# Patient Record
Sex: MALE | Race: WHITE | NOT HISPANIC OR LATINO | URBAN - METROPOLITAN AREA
[De-identification: names, ages, dates, MRNs, and addresses within clinical notes are randomized per-mention and may not be internally consistent; named-entity substitution may affect disease eponyms.]

---

## 2017-11-25 ENCOUNTER — INPATIENT (INPATIENT)
Facility: HOSPITAL | Age: 82
LOS: 16 days | Discharge: NURSING, REHAB OR LONG-TERM CARE | End: 2017-12-12
Attending: SURGERY | Admitting: INTERNAL MEDICINE
Payer: MEDICARE

## 2017-11-25 VITALS
OXYGEN SATURATION: 95 % | DIASTOLIC BLOOD PRESSURE: 107 MMHG | RESPIRATION RATE: 23 BRPM | WEIGHT: 225.09 LBS | TEMPERATURE: 97 F | HEART RATE: 70 BPM | SYSTOLIC BLOOD PRESSURE: 174 MMHG | HEIGHT: 70 IN

## 2017-11-25 LAB
ALBUMIN SERPL ELPH-MCNC: 3.7 G/DL — SIGNIFICANT CHANGE UP (ref 3.3–5)
ALP SERPL-CCNC: 92 U/L — SIGNIFICANT CHANGE UP (ref 40–120)
ALT FLD-CCNC: 27 U/L — SIGNIFICANT CHANGE UP (ref 12–78)
ANION GAP SERPL CALC-SCNC: 17 MMOL/L — SIGNIFICANT CHANGE UP (ref 5–17)
APTT BLD: 47.2 SEC — HIGH (ref 27.5–37.4)
AST SERPL-CCNC: 27 U/L — SIGNIFICANT CHANGE UP (ref 15–37)
BILIRUB SERPL-MCNC: 0.6 MG/DL — SIGNIFICANT CHANGE UP (ref 0.2–1.2)
BUN SERPL-MCNC: 65 MG/DL — HIGH (ref 7–23)
CALCIUM SERPL-MCNC: 10.2 MG/DL — HIGH (ref 8.5–10.1)
CHLORIDE SERPL-SCNC: 97 MMOL/L — SIGNIFICANT CHANGE UP (ref 96–108)
CO2 SERPL-SCNC: 22 MMOL/L — SIGNIFICANT CHANGE UP (ref 22–31)
CREAT SERPL-MCNC: 2.6 MG/DL — HIGH (ref 0.5–1.3)
GLUCOSE SERPL-MCNC: 197 MG/DL — HIGH (ref 70–99)
HCT VFR BLD CALC: 46.9 % — SIGNIFICANT CHANGE UP (ref 39–50)
HGB BLD-MCNC: 15.3 G/DL — SIGNIFICANT CHANGE UP (ref 13–17)
INR BLD: 3.54 RATIO — HIGH (ref 0.88–1.16)
LACTATE SERPL-SCNC: 6.9 MMOL/L — CRITICAL HIGH (ref 0.7–2)
LACTATE SERPL-SCNC: 6.9 MMOL/L — CRITICAL HIGH (ref 0.7–2)
LIDOCAIN IGE QN: 178 U/L — SIGNIFICANT CHANGE UP (ref 73–393)
MCHC RBC-ENTMCNC: 31.6 PG — SIGNIFICANT CHANGE UP (ref 27–34)
MCHC RBC-ENTMCNC: 32.6 GM/DL — SIGNIFICANT CHANGE UP (ref 32–36)
MCV RBC AUTO: 96.9 FL — SIGNIFICANT CHANGE UP (ref 80–100)
PLATELET # BLD AUTO: 324 K/UL — SIGNIFICANT CHANGE UP (ref 150–400)
POTASSIUM SERPL-MCNC: 5.5 MMOL/L — HIGH (ref 3.5–5.3)
POTASSIUM SERPL-SCNC: 5.5 MMOL/L — HIGH (ref 3.5–5.3)
PROT SERPL-MCNC: 8.6 GM/DL — HIGH (ref 6–8.3)
PROTHROM AB SERPL-ACNC: 39.6 SEC — HIGH (ref 9.8–12.7)
RBC # BLD: 4.84 M/UL — SIGNIFICANT CHANGE UP (ref 4.2–5.8)
RBC # FLD: 13.5 % — SIGNIFICANT CHANGE UP (ref 11–15)
SODIUM SERPL-SCNC: 136 MMOL/L — SIGNIFICANT CHANGE UP (ref 135–145)
TROPONIN I SERPL-MCNC: 1.26 NG/ML — HIGH (ref 0.01–0.04)
WBC # BLD: 16.1 K/UL — HIGH (ref 3.8–10.5)
WBC # FLD AUTO: 16.1 K/UL — HIGH (ref 3.8–10.5)

## 2017-11-25 PROCEDURE — 71010: CPT | Mod: 26

## 2017-11-25 PROCEDURE — 99284 EMERGENCY DEPT VISIT MOD MDM: CPT

## 2017-11-25 RX ORDER — PIPERACILLIN AND TAZOBACTAM 4; .5 G/20ML; G/20ML
3.38 INJECTION, POWDER, LYOPHILIZED, FOR SOLUTION INTRAVENOUS ONCE
Qty: 0 | Refills: 0 | Status: COMPLETED | OUTPATIENT
Start: 2017-11-25 | End: 2017-11-25

## 2017-11-25 RX ORDER — GABAPENTIN 400 MG/1
3 CAPSULE ORAL
Qty: 0 | Refills: 0 | COMMUNITY

## 2017-11-25 RX ORDER — VANCOMYCIN HCL 1 G
1000 VIAL (EA) INTRAVENOUS ONCE
Qty: 0 | Refills: 0 | Status: COMPLETED | OUTPATIENT
Start: 2017-11-25 | End: 2017-11-26

## 2017-11-25 RX ORDER — IOHEXOL 300 MG/ML
30 INJECTION, SOLUTION INTRAVENOUS ONCE
Qty: 0 | Refills: 0 | Status: COMPLETED | OUTPATIENT
Start: 2017-11-25 | End: 2017-11-25

## 2017-11-25 RX ORDER — FAMOTIDINE 10 MG/ML
20 INJECTION INTRAVENOUS ONCE
Qty: 0 | Refills: 0 | Status: COMPLETED | OUTPATIENT
Start: 2017-11-25 | End: 2017-11-25

## 2017-11-25 RX ORDER — PANTOPRAZOLE SODIUM 20 MG/1
8 TABLET, DELAYED RELEASE ORAL
Qty: 80 | Refills: 0 | Status: DISCONTINUED | OUTPATIENT
Start: 2017-11-25 | End: 2017-11-26

## 2017-11-25 RX ORDER — SODIUM CHLORIDE 9 MG/ML
500 INJECTION INTRAMUSCULAR; INTRAVENOUS; SUBCUTANEOUS ONCE
Qty: 0 | Refills: 0 | Status: COMPLETED | OUTPATIENT
Start: 2017-11-25 | End: 2017-11-25

## 2017-11-25 RX ORDER — METOCLOPRAMIDE HCL 10 MG
10 TABLET ORAL ONCE
Qty: 0 | Refills: 0 | Status: COMPLETED | OUTPATIENT
Start: 2017-11-25 | End: 2017-11-25

## 2017-11-25 RX ORDER — SODIUM CHLORIDE 9 MG/ML
1000 INJECTION INTRAMUSCULAR; INTRAVENOUS; SUBCUTANEOUS ONCE
Qty: 0 | Refills: 0 | Status: COMPLETED | OUTPATIENT
Start: 2017-11-25 | End: 2017-11-25

## 2017-11-25 RX ORDER — TAMSULOSIN HYDROCHLORIDE 0.4 MG/1
1 CAPSULE ORAL
Qty: 0 | Refills: 0 | COMMUNITY

## 2017-11-25 RX ADMIN — PANTOPRAZOLE SODIUM 10 MG/HR: 20 TABLET, DELAYED RELEASE ORAL at 20:41

## 2017-11-25 RX ADMIN — FAMOTIDINE 20 MILLIGRAM(S): 10 INJECTION INTRAVENOUS at 20:30

## 2017-11-25 RX ADMIN — PIPERACILLIN AND TAZOBACTAM 200 GRAM(S): 4; .5 INJECTION, POWDER, LYOPHILIZED, FOR SOLUTION INTRAVENOUS at 23:23

## 2017-11-25 RX ADMIN — Medication 10 MILLIGRAM(S): at 20:30

## 2017-11-25 RX ADMIN — SODIUM CHLORIDE 1000 MILLILITER(S): 9 INJECTION INTRAMUSCULAR; INTRAVENOUS; SUBCUTANEOUS at 22:32

## 2017-11-25 RX ADMIN — SODIUM CHLORIDE 1000 MILLILITER(S): 9 INJECTION INTRAMUSCULAR; INTRAVENOUS; SUBCUTANEOUS at 23:14

## 2017-11-25 RX ADMIN — IOHEXOL 30 MILLILITER(S): 300 INJECTION, SOLUTION INTRAVENOUS at 20:41

## 2017-11-25 RX ADMIN — SODIUM CHLORIDE 500 MILLILITER(S): 9 INJECTION INTRAMUSCULAR; INTRAVENOUS; SUBCUTANEOUS at 20:30

## 2017-11-25 NOTE — ED PROVIDER NOTE - OBJECTIVE STATEMENT
89 yo M with 3 days of vomiting dark, tarry vomitus.  Pt. also has epigastric abd pain and abd distension that family noticed.  This never happened before.  They initially blamed thanksgiving [place], but realized something serious was wrong.  Pt. has no other complaints currently.    ROS: negative for fever, cough, headache, chest pain, shortness of breath, diarrhea, rash, paresthesia, and weakness.   PMH: a-fib, BPH, CHF, CVA, GERD, hip fx s/p hip replacement, HTN; Meds: coumadin, see EMR for full list; SH: Denies smoking/drinking/drug use   PCP: anyoku admitted pt. before  Daughter, Anneliese, contact: [phone] 89 yo M with 3 days of vomiting dark, tarry vomitus.  Pt. also has epigastric abd pain and abd distension that family noticed.  This never happened before.  They initially blamed thanksgiving [place], but realized something serious was wrong.  Pt. has no other complaints currently.    ROS: negative for fever, cough, headache, chest pain, shortness of breath, diarrhea, rash, paresthesia, and weakness.   PMH: a-fib s/p pacer, BPH, CHF, CVA, GERD, hip fx s/p hip replacement, HTN; Meds: coumadin, see EMR for full list; SH: Denies smoking/drinking/drug use   PCP: anyoku admitted pt. before  Daughter, Anneliese, contact: [phone]

## 2017-11-25 NOTE — ED PROVIDER NOTE - PROGRESS NOTE DETAILS
lactate not improved, trop elevated, but no cp and no acute ischemic change on EKG, elevated bun/cr likely dehydration, currently hydrating, awaiting ct abd/pel CT scan is consistent with ischemic bowel, Dr. Starks called, will come in  pt. currently stable, PA is evaluating patient now, will admit to Sx

## 2017-11-25 NOTE — ED PROVIDER NOTE - MEDICAL DECISION MAKING DETAILS
89 yo m with likely upper GIB  -iv ns bolus, pepcid, reglan, omnipaque prep for scan, pantoprazole, cardiac monitor  -basic labs, coag, type and screen, lipase, lactate (ischemic bowel), trop, cxr, ekg, ct ab/pel++ contrast  -f/u results, reeval, possible admit with GI consult, possible transfusion pending labs

## 2017-11-25 NOTE — CONSULT NOTE ADULT - SUBJECTIVE AND OBJECTIVE BOX
HPI:    HISTORY OF PRESENT ILLNESS:    High Risk Travel:  International Travel? No(1)    · Chief Complaint: The patient is a 89y Male complaining of abdominal pain.	  · HPI Objective Statement: 89 yo M with 3 days of vomiting dark, tarry vomitus.  Pt. also has epigastric abd pain and abd distension that family noticed.  This never happened before.  They initially blamed thanksgiving [place], but realized something serious was wrong.  Pt. has no other complaints currently.    ROS: negative for fever, cough, headache, chest pain, shortness of breath, diarrhea, rash, paresthesia, and weakness.   PMH: a-fib s/p pacer, BPH, CHF, CVA, GERD, hip fx s/p hip replacement, HTN; Meds: coumadin, see EMR for full list; SH: Denies smoking/drinking/drug use   PCP: juan admitted pt. before Daughter, Anneliese, contact: [phone]	    PAST MEDICAL/SURGICAL/FAMILY/SOCIAL HISTORY:    Past Medical History:  Afib  on Coumadin  BPH (benign prostatic hypertrophy)    CHF (congestive heart failure)    CVA (cerebral infarction)  no deficits  GERD (gastroesophageal reflux disease)    Hip fracture    HTN (hypertension)    Peripheral neuropathy    Prostate cancer.     Past Surgical History:  Artificial cardiac pacemaker    Status post-operative repair of hip fracture  ----------------------------------------------------------------------------------------------------------------------------------------------------------------  The patient was in his USOH until two days ago when he had developed N/V. Off and on through out the days. In the ER, it was coffee grounds. No prior history. Only abdomainl surgery was to remove a cyst from the kidney. Had a bowel movement yesterday. Patient deneis BM or flatus today. No Fever. On coumadin. NSAIDs (?)  Had a colonoscopy a while ago     PAST MEDICAL & SURGICAL HISTORY:  Hip fracture  Afib: on Coumadin  Peripheral neuropathy  CVA (cerebral infarction): no deficits  GERD (gastroesophageal reflux disease)  BPH (benign prostatic hypertrophy)  Prostate cancer  CHF (congestive heart failure)  HTN (hypertension)  Status post-operative repair of hip fracture  Artificial cardiac pacemaker      MEDICATIONS  (STANDING):  pantoprazole Infusion 8 mG/Hr (10 mL/Hr) IV Continuous <Continuous>  piperacillin/tazobactam IVPB. 3.375 Gram(s) IV Intermittent once  vancomycin  IVPB 1000 milliGRAM(s) IV Intermittent once    MEDICATIONS  (PRN):      Allergies    No Known Allergies    Intolerances        FAMILY HISTORY:  No pertinent family history in first degree relatives      REVIEW OF SYSTEMS:    CONSTITUTIONAL: No fever, weight loss, or fatigue  EYES: No eye pain, visual disturbances, or discharge  ENMT:  No  tinnitus, vertigo; No sinus or throat pain  NECK: No pain or stiffness  BREASTS: No pain, masses, or nipple discharge  RESPIRATORY: No cough, wheezing, chills or hemoptysis; Minimal shortness of breath  CARDIOVASCULAR: No chest pain, palpitations, dizziness, or leg swelling  GASTROINTESTINAL: See above  NEUROLOGICAL: No headaches, memory loss, loss of strength, numbness, or tremors  SKIN: No itching, burning, rashes, or lesions   LYMPH NODES: No enlarged glands  ENDOCRINE: No heat or cold intolerance; No hair loss  MUSCULOSKELETAL: No joint pain or swelling; No muscle, back, or extremity pain  PSYCHIATRIC: No depression, anxiety, mood swings, or difficulty sleeping  HEME/LYMPH: No easy bruising, or bleeding gums  ALLERGY AND IMMUNOLOGIC: No hives or eczema          SOCIAL HISTORY:    FAMILY HISTORY:  No pertinent family history in first degree relatives      Vital Signs Last 24 Hrs  T(C): 36.2 (25 Nov 2017 19:44), Max: 36.2 (25 Nov 2017 19:44)  T(F): 97.2 (25 Nov 2017 19:44), Max: 97.2 (25 Nov 2017 19:44)  HR: 70 (25 Nov 2017 19:44) (70 - 70)  BP: 174/107 (25 Nov 2017 19:44) (174/107 - 174/107)  BP(mean): --  RR: 23 (25 Nov 2017 19:44) (23 - 23)  SpO2: 95% (25 Nov 2017 19:44) (95% - 95%)    PHYSICAL EXAM:    GENERAL: NAD, well-groomed, well-developed  HEAD:  Atraumatic, Normocephalic  EYES: EOMI, PERRLA, conjunctiva and sclera clear  NECK: Supple, No JVD, Normal thyroid  NERVOUS SYSTEM:  Alert & Oriented  Good concentration; Motor Strength 5/5 B/L upper and lower extremities;   HEART: Regular rate and rhythm; No murmurs, rubs, or gallops  ABDOMEN: Soft, Nontender, distended (+) ; Bowel sounds present  EXTREMITIES:  2+ Peripheral Pulses, No clubbing, cyanosis, or edema  LYMPH: No lymphadenopathy noted   RECTAL: Moderate amount of soft brown stool. Not impacted.  SKIN: No rashes or lesions    LABS:                        15.3   16.1  )-----------( 324      ( 25 Nov 2017 20:31 )             46.9       CBC:  11-25 @ 20:31  WBC  16.1  HGB 15.3  HCT 46.9 Plate 324  MCV 96.9           25 Nov 2017 20:31    136    |  97     |  65     ----------------------------<  197    5.5     |  22     |  2.60     Ca    10.2       25 Nov 2017 20:31    TPro  8.6    /  Alb  3.7    /  TBili  0.6    /  DBili  x      /  AST  27     /  ALT  27     /  AlkPhos  92     25 Nov 2017 20:31    PT/INR - ( 25 Nov 2017 22:03 )   PT: 39.6 sec;   INR: 3.54 ratio         PTT - ( 25 Nov 2017 22:03 )  PTT:47.2 sec        RADIOLOGY & ADDITIONAL STUDIES:

## 2017-11-25 NOTE — ED PROVIDER NOTE - INTERPRETATION
EKG performed in ED, slightly irregular at 67, no evidence of pacing, No acute ischemic changes, LBBB

## 2017-11-25 NOTE — CONSULT NOTE ADULT - ASSESSMENT
· Chief Complaint: The patient is a 89y Male complaining of abdominal pain.	  · HPI Objective Statement: 89 yo M with 3 days of vomiting dark, tarry vomitus.  Pt. also has epigastric abd pain and abd distension that family noticed.  This never happened before.  They initially blamed thanksgiving [place], but realized something serious was wrong.  Pt. has no other complaints currently.    ROS: negative for fever, cough, headache, chest pain, shortness of breath, diarrhea, rash, paresthesia, and weakness.   PMH: a-fib s/p pacer, BPH, CHF, CVA, GERD, hip fx s/p hip replacement, HTN; Meds: coumadin, see EMR for full list; SH: Denies smoking/drinking/drug use   PCP: chelseyu admitted pt. before Daughter, Anneliese, contact: [phone]	  he patient was in his USOH until two days ago when he had developed N/V. Off and on through out the days. In the ER, it was coffee grounds. No prior history. Only abdomainl surgery was to remove a cyst from the kidney. Had a bowel movement yesterday. Patient deneis BM or flatus today. No Fever. On coumadin. NSAIDs (?)  Had a colonoscopy a while ago   ------------Coffee ground emesis - KUB, CT SCAN, (?) NGT, IV Fluid, f/u CBC, IV PPI, NPO    ( discussed with son and daughter - in - law )

## 2017-11-25 NOTE — ED PROVIDER NOTE - PHYSICAL EXAMINATION
Vitals: WNL  Gen: AAOx3, NAD, sitting comfortably in stretcher, answering questions appropriately, hard of hearing, non-toxic  Head: ncat, perrla, eomi b/l  Neck: supple, no lymphadenopathy, no midline deviation  Heart: rrr, no m/r/g  Lungs: CTA b/l, no rales/ronchi/wheezes  Abd: soft, tender in epigastrium, + distension  Ext: no clubbing/cyanosis/edema  Neuro: sensation and muscle strength intact b/l, moving all 4 ext without issue, no facial weakness

## 2017-11-25 NOTE — ED ADULT NURSE NOTE - OBJECTIVE STATEMENT
Assumed care of patient at this time with complaint of abdominal pain and vomiting that started yesterday, brown emesis

## 2017-11-25 NOTE — ED PROVIDER NOTE - CARE PLAN
Principal Discharge DX:	Upper GI bleed Principal Discharge DX:	Upper GI bleed  Secondary Diagnosis:	Ischemic bowel syndrome

## 2017-11-25 NOTE — ED ADULT NURSE NOTE - PMH
Afib  on Coumadin  BPH (benign prostatic hypertrophy)    CHF (congestive heart failure)    CVA (cerebral infarction)  no deficits  GERD (gastroesophageal reflux disease)    Hip fracture    HTN (hypertension)    Peripheral neuropathy    Prostate cancer

## 2017-11-26 DIAGNOSIS — K55.9 VASCULAR DISORDER OF INTESTINE, UNSPECIFIED: ICD-10-CM

## 2017-11-26 DIAGNOSIS — Z85.46 PERSONAL HISTORY OF MALIGNANT NEOPLASM OF PROSTATE: Chronic | ICD-10-CM

## 2017-11-26 DIAGNOSIS — K21.9 GASTRO-ESOPHAGEAL REFLUX DISEASE WITHOUT ESOPHAGITIS: ICD-10-CM

## 2017-11-26 DIAGNOSIS — R74.8 ABNORMAL LEVELS OF OTHER SERUM ENZYMES: ICD-10-CM

## 2017-11-26 DIAGNOSIS — I50.9 HEART FAILURE, UNSPECIFIED: ICD-10-CM

## 2017-11-26 DIAGNOSIS — I10 ESSENTIAL (PRIMARY) HYPERTENSION: ICD-10-CM

## 2017-11-26 DIAGNOSIS — I48.91 UNSPECIFIED ATRIAL FIBRILLATION: ICD-10-CM

## 2017-11-26 DIAGNOSIS — N17.9 ACUTE KIDNEY FAILURE, UNSPECIFIED: ICD-10-CM

## 2017-11-26 DIAGNOSIS — N40.0 BENIGN PROSTATIC HYPERPLASIA WITHOUT LOWER URINARY TRACT SYMPTOMS: ICD-10-CM

## 2017-11-26 DIAGNOSIS — G62.9 POLYNEUROPATHY, UNSPECIFIED: ICD-10-CM

## 2017-11-26 LAB
ABO RH CONFIRMATION: SIGNIFICANT CHANGE UP
ANION GAP SERPL CALC-SCNC: 11 MMOL/L — SIGNIFICANT CHANGE UP (ref 5–17)
ANION GAP SERPL CALC-SCNC: 12 MMOL/L — SIGNIFICANT CHANGE UP (ref 5–17)
ANION GAP SERPL CALC-SCNC: 14 MMOL/L — SIGNIFICANT CHANGE UP (ref 5–17)
APTT BLD: 32.7 SEC — SIGNIFICANT CHANGE UP (ref 27.5–37.4)
APTT BLD: 42.5 SEC — HIGH (ref 27.5–37.4)
BASE EXCESS BLDA CALC-SCNC: -6.4 MMOL/L — LOW (ref -2–2)
BLOOD GAS COMMENTS: SIGNIFICANT CHANGE UP
BLOOD GAS SOURCE: SIGNIFICANT CHANGE UP
BUN SERPL-MCNC: 66 MG/DL — HIGH (ref 7–23)
BUN SERPL-MCNC: 66 MG/DL — HIGH (ref 7–23)
BUN SERPL-MCNC: 70 MG/DL — HIGH (ref 7–23)
CALCIUM SERPL-MCNC: 8 MG/DL — LOW (ref 8.5–10.1)
CALCIUM SERPL-MCNC: 8 MG/DL — LOW (ref 8.5–10.1)
CALCIUM SERPL-MCNC: 8.7 MG/DL — SIGNIFICANT CHANGE UP (ref 8.5–10.1)
CHLORIDE SERPL-SCNC: 105 MMOL/L — SIGNIFICANT CHANGE UP (ref 96–108)
CHLORIDE SERPL-SCNC: 106 MMOL/L — SIGNIFICANT CHANGE UP (ref 96–108)
CHLORIDE SERPL-SCNC: 106 MMOL/L — SIGNIFICANT CHANGE UP (ref 96–108)
CK SERPL-CCNC: 295 U/L — SIGNIFICANT CHANGE UP (ref 26–308)
CO2 SERPL-SCNC: 19 MMOL/L — LOW (ref 22–31)
CO2 SERPL-SCNC: 21 MMOL/L — LOW (ref 22–31)
CO2 SERPL-SCNC: 23 MMOL/L — SIGNIFICANT CHANGE UP (ref 22–31)
CREAT ?TM UR-MCNC: 115 MG/DL — SIGNIFICANT CHANGE UP
CREAT SERPL-MCNC: 2.5 MG/DL — HIGH (ref 0.5–1.3)
CREAT SERPL-MCNC: 2.52 MG/DL — HIGH (ref 0.5–1.3)
CREAT SERPL-MCNC: 2.56 MG/DL — HIGH (ref 0.5–1.3)
GLUCOSE SERPL-MCNC: 125 MG/DL — HIGH (ref 70–99)
GLUCOSE SERPL-MCNC: 145 MG/DL — HIGH (ref 70–99)
GLUCOSE SERPL-MCNC: 169 MG/DL — HIGH (ref 70–99)
HCO3 BLDA-SCNC: 19 MMOL/L — LOW (ref 21–29)
HCT VFR BLD CALC: 37 % — LOW (ref 39–50)
HCT VFR BLD CALC: 42.7 % — SIGNIFICANT CHANGE UP (ref 39–50)
HGB BLD-MCNC: 11.9 G/DL — LOW (ref 13–17)
HGB BLD-MCNC: 13.6 G/DL — SIGNIFICANT CHANGE UP (ref 13–17)
HOROWITZ INDEX BLDA+IHG-RTO: 35 — SIGNIFICANT CHANGE UP
INR BLD: 1.71 RATIO — HIGH (ref 0.88–1.16)
INR BLD: 3.65 RATIO — HIGH (ref 0.88–1.16)
INR BLD: 5.39 RATIO — CRITICAL HIGH (ref 0.88–1.16)
LACTATE SERPL-SCNC: 5.4 MMOL/L — CRITICAL HIGH (ref 0.7–2)
LACTATE SERPL-SCNC: 6.2 MMOL/L — CRITICAL HIGH (ref 0.7–2)
LACTATE SERPL-SCNC: 7.2 MMOL/L — CRITICAL HIGH (ref 0.7–2)
LACTATE SERPL-SCNC: 8.3 MMOL/L — CRITICAL HIGH (ref 0.7–2)
MAGNESIUM SERPL-MCNC: 2 MG/DL — SIGNIFICANT CHANGE UP (ref 1.6–2.6)
MAGNESIUM SERPL-MCNC: 2.3 MG/DL — SIGNIFICANT CHANGE UP (ref 1.6–2.6)
MCHC RBC-ENTMCNC: 31.8 GM/DL — LOW (ref 32–36)
MCHC RBC-ENTMCNC: 31.8 PG — SIGNIFICANT CHANGE UP (ref 27–34)
MCHC RBC-ENTMCNC: 31.8 PG — SIGNIFICANT CHANGE UP (ref 27–34)
MCHC RBC-ENTMCNC: 32.1 GM/DL — SIGNIFICANT CHANGE UP (ref 32–36)
MCV RBC AUTO: 100 FL — SIGNIFICANT CHANGE UP (ref 80–100)
MCV RBC AUTO: 99.1 FL — SIGNIFICANT CHANGE UP (ref 80–100)
OSMOLALITY UR: 467 MOS/KG — SIGNIFICANT CHANGE UP (ref 50–1200)
PCO2 BLDA: 37 MMHG — SIGNIFICANT CHANGE UP (ref 32–46)
PH BLD: 7.32 — LOW (ref 7.35–7.45)
PHOSPHATE SERPL-MCNC: 3.4 MG/DL — SIGNIFICANT CHANGE UP (ref 2.5–4.5)
PHOSPHATE SERPL-MCNC: 3.5 MG/DL — SIGNIFICANT CHANGE UP (ref 2.5–4.5)
PLATELET # BLD AUTO: 205 K/UL — SIGNIFICANT CHANGE UP (ref 150–400)
PLATELET # BLD AUTO: 253 K/UL — SIGNIFICANT CHANGE UP (ref 150–400)
PO2 BLDA: 99 MMHG — SIGNIFICANT CHANGE UP (ref 74–108)
POTASSIUM SERPL-MCNC: 5.1 MMOL/L — SIGNIFICANT CHANGE UP (ref 3.5–5.3)
POTASSIUM SERPL-MCNC: 5.8 MMOL/L — HIGH (ref 3.5–5.3)
POTASSIUM SERPL-MCNC: 5.8 MMOL/L — HIGH (ref 3.5–5.3)
POTASSIUM SERPL-SCNC: 5.1 MMOL/L — SIGNIFICANT CHANGE UP (ref 3.5–5.3)
POTASSIUM SERPL-SCNC: 5.8 MMOL/L — HIGH (ref 3.5–5.3)
POTASSIUM SERPL-SCNC: 5.8 MMOL/L — HIGH (ref 3.5–5.3)
POTASSIUM UR-SCNC: 86 MMOL/L — SIGNIFICANT CHANGE UP
PROTHROM AB SERPL-ACNC: 18.9 SEC — HIGH (ref 9.8–12.7)
PROTHROM AB SERPL-ACNC: 40.8 SEC — HIGH (ref 9.8–12.7)
PROTHROM AB SERPL-ACNC: 60.8 SEC — HIGH (ref 9.8–12.7)
RBC # BLD: 3.73 M/UL — LOW (ref 4.2–5.8)
RBC # BLD: 4.27 M/UL — SIGNIFICANT CHANGE UP (ref 4.2–5.8)
RBC # FLD: 13.6 % — SIGNIFICANT CHANGE UP (ref 11–15)
RBC # FLD: 13.9 % — SIGNIFICANT CHANGE UP (ref 11–15)
SAO2 % BLDA: 97 % — HIGH (ref 92–96)
SODIUM SERPL-SCNC: 138 MMOL/L — SIGNIFICANT CHANGE UP (ref 135–145)
SODIUM SERPL-SCNC: 139 MMOL/L — SIGNIFICANT CHANGE UP (ref 135–145)
SODIUM SERPL-SCNC: 140 MMOL/L — SIGNIFICANT CHANGE UP (ref 135–145)
SODIUM UR-SCNC: <20 MMOL/L — SIGNIFICANT CHANGE UP
TROPONIN I SERPL-MCNC: 1.3 NG/ML — HIGH (ref 0.01–0.04)
TROPONIN I SERPL-MCNC: 1.4 NG/ML — HIGH (ref 0.01–0.04)
WBC # BLD: 10.1 K/UL — SIGNIFICANT CHANGE UP (ref 3.8–10.5)
WBC # BLD: 13.8 K/UL — HIGH (ref 3.8–10.5)
WBC # FLD AUTO: 10.1 K/UL — SIGNIFICANT CHANGE UP (ref 3.8–10.5)
WBC # FLD AUTO: 13.8 K/UL — HIGH (ref 3.8–10.5)

## 2017-11-26 PROCEDURE — 49505 PRP I/HERN INIT REDUC >5 YR: CPT | Mod: AS

## 2017-11-26 PROCEDURE — 74176 CT ABD & PELVIS W/O CONTRAST: CPT | Mod: 26

## 2017-11-26 PROCEDURE — 93010 ELECTROCARDIOGRAM REPORT: CPT

## 2017-11-26 PROCEDURE — 49320 DIAG LAPARO SEPARATE PROC: CPT | Mod: AS

## 2017-11-26 PROCEDURE — 71010: CPT | Mod: 26

## 2017-11-26 PROCEDURE — 99291 CRITICAL CARE FIRST HOUR: CPT

## 2017-11-26 RX ORDER — PANTOPRAZOLE SODIUM 20 MG/1
40 TABLET, DELAYED RELEASE ORAL DAILY
Qty: 0 | Refills: 0 | Status: DISCONTINUED | OUTPATIENT
Start: 2017-11-26 | End: 2017-11-26

## 2017-11-26 RX ORDER — DEXTROSE 50 % IN WATER 50 %
50 SYRINGE (ML) INTRAVENOUS ONCE
Qty: 0 | Refills: 0 | Status: COMPLETED | OUTPATIENT
Start: 2017-11-26 | End: 2017-11-26

## 2017-11-26 RX ORDER — FENTANYL CITRATE 50 UG/ML
50 INJECTION INTRAVENOUS ONCE
Qty: 0 | Refills: 0 | Status: DISCONTINUED | OUTPATIENT
Start: 2017-11-26 | End: 2017-11-26

## 2017-11-26 RX ORDER — GABAPENTIN 400 MG/1
300 CAPSULE ORAL THREE TIMES A DAY
Qty: 0 | Refills: 0 | Status: DISCONTINUED | OUTPATIENT
Start: 2017-11-26 | End: 2017-11-26

## 2017-11-26 RX ORDER — SODIUM CHLORIDE 9 MG/ML
1000 INJECTION, SOLUTION INTRAVENOUS
Qty: 0 | Refills: 0 | Status: DISCONTINUED | OUTPATIENT
Start: 2017-11-26 | End: 2017-11-26

## 2017-11-26 RX ORDER — FENTANYL CITRATE 50 UG/ML
50 INJECTION INTRAVENOUS EVERY 4 HOURS
Qty: 0 | Refills: 0 | Status: DISCONTINUED | OUTPATIENT
Start: 2017-11-26 | End: 2017-11-28

## 2017-11-26 RX ORDER — PANTOPRAZOLE SODIUM 20 MG/1
40 TABLET, DELAYED RELEASE ORAL DAILY
Qty: 0 | Refills: 0 | Status: DISCONTINUED | OUTPATIENT
Start: 2017-11-26 | End: 2017-11-27

## 2017-11-26 RX ORDER — TAMSULOSIN HYDROCHLORIDE 0.4 MG/1
0.4 CAPSULE ORAL AT BEDTIME
Qty: 0 | Refills: 0 | Status: DISCONTINUED | OUTPATIENT
Start: 2017-11-26 | End: 2017-11-26

## 2017-11-26 RX ORDER — FUROSEMIDE 40 MG
20 TABLET ORAL DAILY
Qty: 0 | Refills: 0 | Status: DISCONTINUED | OUTPATIENT
Start: 2017-11-26 | End: 2017-11-26

## 2017-11-26 RX ORDER — PIPERACILLIN AND TAZOBACTAM 4; .5 G/20ML; G/20ML
3.38 INJECTION, POWDER, LYOPHILIZED, FOR SOLUTION INTRAVENOUS EVERY 8 HOURS
Qty: 0 | Refills: 0 | Status: DISCONTINUED | OUTPATIENT
Start: 2017-11-26 | End: 2017-11-26

## 2017-11-26 RX ORDER — LATANOPROST 0.05 MG/ML
1 SOLUTION/ DROPS OPHTHALMIC; TOPICAL AT BEDTIME
Qty: 0 | Refills: 0 | Status: DISCONTINUED | OUTPATIENT
Start: 2017-11-26 | End: 2017-11-26

## 2017-11-26 RX ORDER — SODIUM CHLORIDE 9 MG/ML
1000 INJECTION INTRAMUSCULAR; INTRAVENOUS; SUBCUTANEOUS
Qty: 0 | Refills: 0 | Status: DISCONTINUED | OUTPATIENT
Start: 2017-11-26 | End: 2017-11-29

## 2017-11-26 RX ORDER — MORPHINE SULFATE 50 MG/1
2 CAPSULE, EXTENDED RELEASE ORAL EVERY 6 HOURS
Qty: 0 | Refills: 0 | Status: DISCONTINUED | OUTPATIENT
Start: 2017-11-26 | End: 2017-11-26

## 2017-11-26 RX ORDER — BRIMONIDINE TARTRATE 2 MG/MG
1 SOLUTION/ DROPS OPHTHALMIC
Qty: 0 | Refills: 0 | Status: DISCONTINUED | OUTPATIENT
Start: 2017-11-26 | End: 2017-12-12

## 2017-11-26 RX ORDER — BRIMONIDINE TARTRATE 2 MG/MG
1 SOLUTION/ DROPS OPHTHALMIC
Qty: 0 | Refills: 0 | Status: DISCONTINUED | OUTPATIENT
Start: 2017-11-26 | End: 2017-11-26

## 2017-11-26 RX ORDER — QUETIAPINE FUMARATE 200 MG/1
25 TABLET, FILM COATED ORAL THREE TIMES A DAY
Qty: 0 | Refills: 0 | Status: DISCONTINUED | OUTPATIENT
Start: 2017-11-26 | End: 2017-11-26

## 2017-11-26 RX ORDER — CHLORHEXIDINE GLUCONATE 213 G/1000ML
1 SOLUTION TOPICAL DAILY
Qty: 0 | Refills: 0 | Status: DISCONTINUED | OUTPATIENT
Start: 2017-11-26 | End: 2017-11-29

## 2017-11-26 RX ORDER — SODIUM CHLORIDE 9 MG/ML
1000 INJECTION INTRAMUSCULAR; INTRAVENOUS; SUBCUTANEOUS ONCE
Qty: 0 | Refills: 0 | Status: COMPLETED | OUTPATIENT
Start: 2017-11-26 | End: 2017-11-26

## 2017-11-26 RX ORDER — INSULIN HUMAN 100 [IU]/ML
10 INJECTION, SOLUTION SUBCUTANEOUS ONCE
Qty: 0 | Refills: 0 | Status: COMPLETED | OUTPATIENT
Start: 2017-11-26 | End: 2017-11-26

## 2017-11-26 RX ORDER — TIMOLOL 0.5 %
1 DROPS OPHTHALMIC (EYE)
Qty: 0 | Refills: 0 | Status: DISCONTINUED | OUTPATIENT
Start: 2017-11-26 | End: 2017-11-26

## 2017-11-26 RX ORDER — MORPHINE SULFATE 50 MG/1
4 CAPSULE, EXTENDED RELEASE ORAL EVERY 6 HOURS
Qty: 0 | Refills: 0 | Status: DISCONTINUED | OUTPATIENT
Start: 2017-11-26 | End: 2017-11-26

## 2017-11-26 RX ORDER — CALCIUM GLUCONATE 100 MG/ML
1 VIAL (ML) INTRAVENOUS ONCE
Qty: 0 | Refills: 0 | Status: COMPLETED | OUTPATIENT
Start: 2017-11-26 | End: 2017-11-26

## 2017-11-26 RX ORDER — CHLORHEXIDINE GLUCONATE 213 G/1000ML
15 SOLUTION TOPICAL
Qty: 0 | Refills: 0 | Status: DISCONTINUED | OUTPATIENT
Start: 2017-11-26 | End: 2017-11-27

## 2017-11-26 RX ORDER — SODIUM CHLORIDE 9 MG/ML
1000 INJECTION, SOLUTION INTRAVENOUS
Qty: 0 | Refills: 0 | Status: COMPLETED | OUTPATIENT
Start: 2017-11-26 | End: 2017-11-26

## 2017-11-26 RX ORDER — SERTRALINE 25 MG/1
25 TABLET, FILM COATED ORAL DAILY
Qty: 0 | Refills: 0 | Status: DISCONTINUED | OUTPATIENT
Start: 2017-11-26 | End: 2017-11-26

## 2017-11-26 RX ORDER — SODIUM CHLORIDE 9 MG/ML
1000 INJECTION INTRAMUSCULAR; INTRAVENOUS; SUBCUTANEOUS
Qty: 0 | Refills: 0 | Status: DISCONTINUED | OUTPATIENT
Start: 2017-11-26 | End: 2017-11-26

## 2017-11-26 RX ORDER — ONDANSETRON 8 MG/1
4 TABLET, FILM COATED ORAL EVERY 6 HOURS
Qty: 0 | Refills: 0 | Status: DISCONTINUED | OUTPATIENT
Start: 2017-11-26 | End: 2017-11-26

## 2017-11-26 RX ORDER — SODIUM BICARBONATE 1 MEQ/ML
50 SYRINGE (ML) INTRAVENOUS ONCE
Qty: 0 | Refills: 0 | Status: COMPLETED | OUTPATIENT
Start: 2017-11-26 | End: 2017-11-26

## 2017-11-26 RX ORDER — PIPERACILLIN AND TAZOBACTAM 4; .5 G/20ML; G/20ML
3.38 INJECTION, POWDER, LYOPHILIZED, FOR SOLUTION INTRAVENOUS EVERY 8 HOURS
Qty: 0 | Refills: 0 | Status: COMPLETED | OUTPATIENT
Start: 2017-11-26 | End: 2017-11-27

## 2017-11-26 RX ORDER — PROPOFOL 10 MG/ML
10 INJECTION, EMULSION INTRAVENOUS
Qty: 1000 | Refills: 0 | Status: DISCONTINUED | OUTPATIENT
Start: 2017-11-26 | End: 2017-11-27

## 2017-11-26 RX ORDER — MORPHINE SULFATE 50 MG/1
4 CAPSULE, EXTENDED RELEASE ORAL ONCE
Qty: 0 | Refills: 0 | Status: DISCONTINUED | OUTPATIENT
Start: 2017-11-26 | End: 2017-11-26

## 2017-11-26 RX ORDER — CARVEDILOL PHOSPHATE 80 MG/1
3.12 CAPSULE, EXTENDED RELEASE ORAL EVERY 12 HOURS
Qty: 0 | Refills: 0 | Status: DISCONTINUED | OUTPATIENT
Start: 2017-11-26 | End: 2017-11-26

## 2017-11-26 RX ORDER — SPIRONOLACTONE 25 MG/1
25 TABLET, FILM COATED ORAL DAILY
Qty: 0 | Refills: 0 | Status: DISCONTINUED | OUTPATIENT
Start: 2017-11-26 | End: 2017-11-26

## 2017-11-26 RX ORDER — PHYTONADIONE (VIT K1) 5 MG
10 TABLET ORAL ONCE
Qty: 0 | Refills: 0 | Status: COMPLETED | OUTPATIENT
Start: 2017-11-26 | End: 2017-11-26

## 2017-11-26 RX ADMIN — SODIUM CHLORIDE 1000 MILLILITER(S): 9 INJECTION INTRAMUSCULAR; INTRAVENOUS; SUBCUTANEOUS at 18:00

## 2017-11-26 RX ADMIN — PROPOFOL 6.13 MICROGRAM(S)/KG/MIN: 10 INJECTION, EMULSION INTRAVENOUS at 13:01

## 2017-11-26 RX ADMIN — Medication 250 MILLIGRAM(S): at 00:07

## 2017-11-26 RX ADMIN — INSULIN HUMAN 10 UNIT(S): 100 INJECTION, SOLUTION SUBCUTANEOUS at 20:00

## 2017-11-26 RX ADMIN — SODIUM CHLORIDE 100 MILLILITER(S): 9 INJECTION INTRAMUSCULAR; INTRAVENOUS; SUBCUTANEOUS at 08:08

## 2017-11-26 RX ADMIN — FENTANYL CITRATE 50 MICROGRAM(S): 50 INJECTION INTRAVENOUS at 09:15

## 2017-11-26 RX ADMIN — BRIMONIDINE TARTRATE 1 DROP(S): 2 SOLUTION/ DROPS OPHTHALMIC at 18:34

## 2017-11-26 RX ADMIN — FENTANYL CITRATE 50 MICROGRAM(S): 50 INJECTION INTRAVENOUS at 09:35

## 2017-11-26 RX ADMIN — Medication 102 MILLIGRAM(S): at 16:18

## 2017-11-26 RX ADMIN — SODIUM CHLORIDE 100 MILLILITER(S): 9 INJECTION INTRAMUSCULAR; INTRAVENOUS; SUBCUTANEOUS at 20:01

## 2017-11-26 RX ADMIN — PIPERACILLIN AND TAZOBACTAM 25 GRAM(S): 4; .5 INJECTION, POWDER, LYOPHILIZED, FOR SOLUTION INTRAVENOUS at 21:04

## 2017-11-26 RX ADMIN — CHLORHEXIDINE GLUCONATE 1 APPLICATION(S): 213 SOLUTION TOPICAL at 13:00

## 2017-11-26 RX ADMIN — PROPOFOL 6.13 MICROGRAM(S)/KG/MIN: 10 INJECTION, EMULSION INTRAVENOUS at 18:17

## 2017-11-26 RX ADMIN — FENTANYL CITRATE 50 MICROGRAM(S): 50 INJECTION INTRAVENOUS at 20:26

## 2017-11-26 RX ADMIN — MORPHINE SULFATE 4 MILLIGRAM(S): 50 CAPSULE, EXTENDED RELEASE ORAL at 01:40

## 2017-11-26 RX ADMIN — SODIUM CHLORIDE 1000 MILLILITER(S): 9 INJECTION, SOLUTION INTRAVENOUS at 14:00

## 2017-11-26 RX ADMIN — PANTOPRAZOLE SODIUM 40 MILLIGRAM(S): 20 TABLET, DELAYED RELEASE ORAL at 13:01

## 2017-11-26 RX ADMIN — FENTANYL CITRATE 50 MICROGRAM(S): 50 INJECTION INTRAVENOUS at 20:45

## 2017-11-26 RX ADMIN — Medication 200 GRAM(S): at 20:10

## 2017-11-26 RX ADMIN — CHLORHEXIDINE GLUCONATE 15 MILLILITER(S): 213 SOLUTION TOPICAL at 18:17

## 2017-11-26 RX ADMIN — Medication 50 MILLILITER(S): at 20:00

## 2017-11-26 RX ADMIN — Medication 50 MILLIEQUIVALENT(S): at 20:00

## 2017-11-26 RX ADMIN — SODIUM CHLORIDE 75 MILLILITER(S): 9 INJECTION INTRAMUSCULAR; INTRAVENOUS; SUBCUTANEOUS at 02:13

## 2017-11-26 RX ADMIN — PIPERACILLIN AND TAZOBACTAM 25 GRAM(S): 4; .5 INJECTION, POWDER, LYOPHILIZED, FOR SOLUTION INTRAVENOUS at 13:54

## 2017-11-26 RX ADMIN — SODIUM CHLORIDE 1000 MILLILITER(S): 9 INJECTION, SOLUTION INTRAVENOUS at 12:00

## 2017-11-26 RX ADMIN — PROPOFOL 6.13 MICROGRAM(S)/KG/MIN: 10 INJECTION, EMULSION INTRAVENOUS at 22:27

## 2017-11-26 NOTE — BRIEF OPERATIVE NOTE - PROCEDURE
<<-----Click on this checkbox to enter Procedure Reduction of incarcerated hernia  11/26/2017    Active  NMARINO1  Lysis of adhesions  11/26/2017    Active  NMARINO1  Exploratory laparotomy  11/26/2017    Active  NMARINO1

## 2017-11-26 NOTE — CONSULT NOTE ADULT - SUBJECTIVE AND OBJECTIVE BOX
Patient is a 89y old  Male who presents with a chief complaint of Coffee ground emesis, abdominal distention, and upper abdominal pain x 3 days (26 Nov 2017 01:47)    PAST MEDICAL & SURGICAL HISTORY:  Hip fracture  Afib: on Coumadin  Peripheral neuropathy  CVA (cerebral infarction): no deficits  GERD (gastroesophageal reflux disease)  BPH (benign prostatic hypertrophy)  Prostate cancer  CHF (congestive heart failure)  HTN (hypertension)  H/O prostate cancer: s/p seed implantation  Status post-operative repair of hip fracture  Artificial cardiac pacemaker    MARGARITO JONES   89y    Male    BRIEF HOSPITAL COURSE:   POD #0 release of R IH     Review of Systems:                       All other ROS are negative.    ICU Vital Signs Last 24 Hrs  T(C): 36.2 (26 Nov 2017 06:00), Max: 36.7 (25 Nov 2017 23:46)  T(F): 97.2 (26 Nov 2017 06:00), Max: 98.1 (25 Nov 2017 23:46)  HR: 60 (26 Nov 2017 06:30) (60 - 78)  BP: 160/70 (26 Nov 2017 03:30) (155/76 - 174/107)  BP(mean): --  ABP: 164/64 (26 Nov 2017 06:30) (164/64 - 176/73)  ABP(mean): 98 (26 Nov 2017 06:30) (98 - 108)  RR: 19 (26 Nov 2017 06:30) (11 - 23)  SpO2: 99% (26 Nov 2017 06:30) (95% - 100%)    Physical Examination:    General:   NAD    HEENT:  NGT    PULM:  bilateral BS    CVS:  s1 s2reg paced     ABD:  dressing clean    EXT:  no edema     SKIN:  warm    Neuro:  sedate.        Mode: AC/ CMV (Assist Control/ Continuous Mandatory Ventilation)  RR (machine): 14  TV (machine): 450  FiO2: 35  PEEP: 5  ITime: 0.9  MAP: 10  PIP: 35    Mode: AC/ CMV (Assist Control/ Continuous Mandatory Ventilation), RR (machine): 14, TV (machine): 450, FiO2: 35, PEEP: 5, ITime: 0.9, MAP: 10, PIP: 35  LABS:                        13.6   13.8  )-----------( 253      ( 26 Nov 2017 07:04 )             42.7     11-25    136  |  97  |  65<H>  ----------------------------<  197<H>  5.5<H>   |  22  |  2.60<H>    Ca    10.2<H>      25 Nov 2017 20:31    TPro  8.6<H>  /  Alb  3.7  /  TBili  0.6  /  DBili  x   /  AST  27  /  ALT  27  /  AlkPhos  92  11-25      CARDIAC MARKERS ( 26 Nov 2017 07:05 )  1.400 ng/mL / x     / x     / x     / x      CARDIAC MARKERS ( 25 Nov 2017 20:31 )  1.260 ng/mL / x     / x     / x     / x          CAPILLARY BLOOD GLUCOSE    PT/INR - ( 26 Nov 2017 07:05 )   PT: 40.8 sec;   INR: 3.65 ratio         PTT - ( 26 Nov 2017 07:05 )  PTT:42.5 sec    CULTURES:      Medications:  piperacillin/tazobactam IVPB. 3.375 Gram(s) IV Intermittent every 8 hours  propofol Infusion 10 MICROgram(s)/kG/Min IV Continuous <Continuous>  pantoprazole  Injectable 40 milliGRAM(s) IV Push daily  sodium chloride 0.9%. 1000 milliLiter(s) IV Continuous <Continuous  chlorhexidine 0.12% Liquid 15 milliLiter(s) Swish and Spit two times a day  chlorhexidine 4% Liquid 1 Application(s) Topical daily  RADIOLOGY/IMAGING/ECHO    < from: CT Abdomen and Pelvis w/ Oral Cont (11.26.17 @ 00:08) >  Portal venous gas and extensive small bowel pneumatosis concerning for   ischemic bowel. Massive dilatation of stomach and numerous small bowel   loops most likely related to a right inguinal hernia containing small   bowel and cecum.  Mild interloop fluid and mesenteric edema.  The colon   is collapsed.   No free air. Findings were discussed with Dr. Bustamante at   12:45 5AM on November 26, 2017 with RBV.  The patient would benefit from   enteric tube placement.      CXR without infiltrate     Critical care point of care ultrasound:    Assessment/Plan:    89 y/o male PMHx of a-fib on coumadin PPM BPH, CHF, CVA, GERD, hip fx s/p hip replacement, HTN, seed implantation for prostate cancer, kidney cyst removal.  Mild dementia.  Admit with a few days of abdominal pain, and coffe ground emesis.  Found with a severe metabolic lactic acisosis and ANDREW  CT with diffuse SB intraluminal air.  Was taken to OR for ex lap.  Found to have an incarcerated R inguinal hernia.      Per discussion with surgical PA, patient had a release and not repair of RIH.  There was no bowel rsxn as there was no compromised bowel.   FFP was give prior to OR for elevated INR        Perio abx  IVF  SBT when NMB's have metabolixed out of the system.    Propofol for now as getting agitated, but not awake.    Repeat labs/lactate.      Minutes of Critical Care time:  34  (Reviewing data, imaging, discussing with multidisciplinary team, non inclusive of procedures, discussing goals of care with patient/family)

## 2017-11-26 NOTE — H&P ADULT - PROBLEM SELECTOR PLAN 1
-Admit patient to telemetry  -Emergent OR now  -pre-op labs: cbc, cmp, coags, type and screen  -NGT stat  -[place] stat  -3 units FFP  -IV Zosyn  -Protonix  -pain management prn  -ant-emetic prn  -DVT ppx with intermittent pneumatic compression  -Discussed with Dr. Starks

## 2017-11-26 NOTE — PROGRESS NOTE ADULT - SUBJECTIVE AND OBJECTIVE BOX
Post-op check    S/P Ex lap, BEATRIS, reduction of incarcerated R inguinal hernia POD#0  89 year old Male seen and examined at bedside in CCU intubated and resting comfortably.     Vital Signs Last 24 Hrs  T(F): 97 (11-26-17 @ 03:30), Max: 98.1 (11-25-17 @ 23:46)  HR: 60 (11-26-17 @ 06:30)  BP: 160/70 (11-26-17 @ 03:30)  RR: 19 (11-26-17 @ 06:30)  SpO2: 99% (11-26-17 @ 06:30)  Wt(kg): --   CAPILLARY BLOOD GLUCOSE      GENERAL: Alert, NAD  HEAD: NGT with dark output  CHEST/LUNG: Clear to auscultation bilaterally, respirations nonlabored  HEART: S1S2, Regular rate and rhythm  ABDOMEN: Abdominal binder in place. Aquacel dressing C/D/I; No Bowel sounds, +distended  : sharp with clear yellow urine  EXTREMITIES:  no calf tenderness, No edema, intermittent compression devices in place bilaterally    Assessment: 89 y/o male PMHx of a-fib s/p pacer, BPH, CHF, CVA, GERD, hip fx s/p hip replacement, HTN, seed implantation for prostate cancer admitted with ischemic bowel 2/2 incarcerated R inguinal hernia S/P Ex lap, BEATRIS, reduction of incarcerated R inguinal hernia POD#0    Plan:  - local wound care  - DVT prophylaxis, pain control  - Continue antibiotics   - f/u labs   - continue current management per CCU  - will discuss with attending

## 2017-11-26 NOTE — H&P ADULT - PSH
Artificial cardiac pacemaker    H/O prostate cancer  s/p seed implantation  Status post-operative repair of hip fracture

## 2017-11-26 NOTE — H&P ADULT - NSHPLABSRESULTS_GEN_ALL_CORE
Vital Signs Last 24 Hrs  T(C): 36.7 (25 Nov 2017 23:46), Max: 36.7 (25 Nov 2017 23:46)  T(F): 98.1 (25 Nov 2017 23:46), Max: 98.1 (25 Nov 2017 23:46)  HR: 77 (25 Nov 2017 23:46) (70 - 77)  BP: 164/67 (25 Nov 2017 23:46) (164/67 - 174/107)  BP(mean): --  RR: 20 (25 Nov 2017 23:46) (20 - 23)  SpO2: 95% (25 Nov 2017 23:46) (95% - 95%)                        15.3   16.1  )-----------( 324      ( 25 Nov 2017 20:31 )             46.9   11-25    136  |  97  |  65<H>  ----------------------------<  197<H>  5.5<H>   |  22  |  2.60<H>    Ca    10.2<H>      25 Nov 2017 20:31    TPro  8.6<H>  /  Alb  3.7  /  TBili  0.6  /  DBili  x   /  AST  27  /  ALT  27  /  AlkPhos  92  11-25  lactate: 6.9  INR: 3.54    CT Abdomen and Pelvis w/ Oral Cont (11.26.17 @ 00:08)   IMPRESSION:  Portal venous gas and extensive small bowel pneumatosis concerning for   ischemic bowel. Massive dilatation of stomach and numerous small bowel   loops most likely related to a right inguinal hernia containing small   bowel and cecum.  Mild interloop fluid and mesenteric edema.  The colon   is collapsed.   No free air. Findings were discussed with Dr. Bustamante at   12:45 5AM on November 26, 2017 with RBV.  The patient would benefit from   enteric tube placement.

## 2017-11-26 NOTE — BRIEF OPERATIVE NOTE - POST-OP DX
Incarcerated inguinal hernia  11/26/2017  Right  Active  Kelley Vang  Ischemic bowel disease  11/26/2017    Active  Kelley Vang

## 2017-11-26 NOTE — CONSULT NOTE ADULT - ATTENDING COMMENTS
89 y/o male PMHx of a-fib on coumadin PPM BPH, CHF, CVA, HTN, seed implantation for prostate cancer, admitted w/ incarcerated R inguinal hernia s/p emergent ex-lap w/ BEATRIS and release of hernia, NSTEMI and ANDREW. Pt hemodynamically stable postop and remains ventilated. COnt empiric abx w/ zosyn. Cont ngt to suction to cont decompression. Monitor LA clearance. NSTEMI from demand iscehmia inr supratherapeutic and cannot toelrate po meds. BP low and would not start bb. ANDREW from ATN. Monitor electrolytes, repeat serially. check urine lytes. FFP and vit K for inr 5 now.

## 2017-11-26 NOTE — H&P ADULT - HISTORY OF PRESENT ILLNESS
89 y/o male PMHx of a-fib s/p pacer, BPH, CHF, CVA, GERD, hip fx s/p hip replacement, HTN, seed implantation for prostate cancer, kidney cyst removal c/o coffee ground emesis, abdominal distention, and severe upper abdominal pain x 3 days. Patient is a poor historian so most of history was given by daughter in law Anneliese. States that the patient's aids noticed these symptoms for the past 3 days, worsening today. Coffee ground emesis witnessed by ED RN. Patient denies pain or nausea now. Last BM was yesterday and normal. Last ate at 7pm. Patient denies fever, chills, constipation, diarrhea, hematuria, melena, hematochezia, chest pain, shortness of breath, dizziness. Patient denies prior incident.

## 2017-11-26 NOTE — H&P ADULT - ASSESSMENT
89 y/o male PMHx of a-fib s/p pacer, BPH, CHF, CVA, GERD, hip fx s/p hip replacement, HTN, seed implantation for prostate cancer c/o coffee ground emesis, abdominal distention, and severe upper abdominal pain x 3 days. Admitted with ischemic bowel and pneumatosis.

## 2017-11-27 LAB
ANION GAP SERPL CALC-SCNC: 9 MMOL/L — SIGNIFICANT CHANGE UP (ref 5–17)
BASE EXCESS BLDA CALC-SCNC: -8 MMOL/L — LOW (ref -2–2)
BLOOD GAS COMMENTS: SIGNIFICANT CHANGE UP
BLOOD GAS SOURCE: SIGNIFICANT CHANGE UP
BUN SERPL-MCNC: 69 MG/DL — HIGH (ref 7–23)
CALCIUM SERPL-MCNC: 8 MG/DL — LOW (ref 8.5–10.1)
CHLORIDE SERPL-SCNC: 107 MMOL/L — SIGNIFICANT CHANGE UP (ref 96–108)
CK SERPL-CCNC: 287 U/L — SIGNIFICANT CHANGE UP (ref 26–308)
CO2 SERPL-SCNC: 24 MMOL/L — SIGNIFICANT CHANGE UP (ref 22–31)
CREAT SERPL-MCNC: 2.34 MG/DL — HIGH (ref 0.5–1.3)
GLUCOSE SERPL-MCNC: 121 MG/DL — HIGH (ref 70–99)
HCO3 BLDA-SCNC: 17 MMOL/L — LOW (ref 21–29)
HCT VFR BLD CALC: 31.9 % — LOW (ref 39–50)
HGB BLD-MCNC: 10.5 G/DL — LOW (ref 13–17)
HOROWITZ INDEX BLDA+IHG-RTO: 100 — SIGNIFICANT CHANGE UP
INR BLD: 1.36 RATIO — HIGH (ref 0.88–1.16)
LACTATE SERPL-SCNC: 2.7 MMOL/L — HIGH (ref 0.7–2)
MAGNESIUM SERPL-MCNC: 2.2 MG/DL — SIGNIFICANT CHANGE UP (ref 1.6–2.6)
MCHC RBC-ENTMCNC: 31.9 PG — SIGNIFICANT CHANGE UP (ref 27–34)
MCHC RBC-ENTMCNC: 33 GM/DL — SIGNIFICANT CHANGE UP (ref 32–36)
MCV RBC AUTO: 96.8 FL — SIGNIFICANT CHANGE UP (ref 80–100)
PCO2 BLDA: 35 MMHG — SIGNIFICANT CHANGE UP (ref 32–46)
PH BLD: 7.3 — LOW (ref 7.35–7.45)
PHOSPHATE SERPL-MCNC: 3.2 MG/DL — SIGNIFICANT CHANGE UP (ref 2.5–4.5)
PLATELET # BLD AUTO: 198 K/UL — SIGNIFICANT CHANGE UP (ref 150–400)
PO2 BLDA: 457 MMHG — HIGH (ref 74–108)
POTASSIUM SERPL-MCNC: 5.2 MMOL/L — SIGNIFICANT CHANGE UP (ref 3.5–5.3)
POTASSIUM SERPL-SCNC: 5.2 MMOL/L — SIGNIFICANT CHANGE UP (ref 3.5–5.3)
PROTHROM AB SERPL-ACNC: 14.9 SEC — HIGH (ref 9.8–12.7)
RBC # BLD: 3.29 M/UL — LOW (ref 4.2–5.8)
RBC # FLD: 13.4 % — SIGNIFICANT CHANGE UP (ref 11–15)
SAO2 % BLDA: 100 % — HIGH (ref 92–96)
SODIUM SERPL-SCNC: 140 MMOL/L — SIGNIFICANT CHANGE UP (ref 135–145)
TROPONIN I SERPL-MCNC: 1.18 NG/ML — HIGH (ref 0.01–0.04)
TROPONIN I SERPL-MCNC: 1.19 NG/ML — HIGH (ref 0.01–0.04)
WBC # BLD: 9.7 K/UL — SIGNIFICANT CHANGE UP (ref 3.8–10.5)
WBC # FLD AUTO: 9.7 K/UL — SIGNIFICANT CHANGE UP (ref 3.8–10.5)

## 2017-11-27 PROCEDURE — 71010: CPT | Mod: 26

## 2017-11-27 PROCEDURE — 99291 CRITICAL CARE FIRST HOUR: CPT

## 2017-11-27 RX ORDER — CARVEDILOL PHOSPHATE 80 MG/1
3.12 CAPSULE, EXTENDED RELEASE ORAL EVERY 12 HOURS
Qty: 0 | Refills: 0 | Status: DISCONTINUED | OUTPATIENT
Start: 2017-11-27 | End: 2017-12-12

## 2017-11-27 RX ORDER — ACETAMINOPHEN 500 MG
1000 TABLET ORAL ONCE
Qty: 0 | Refills: 0 | Status: COMPLETED | OUTPATIENT
Start: 2017-11-27 | End: 2017-11-27

## 2017-11-27 RX ORDER — DEXMEDETOMIDINE HYDROCHLORIDE IN 0.9% SODIUM CHLORIDE 4 UG/ML
0.2 INJECTION INTRAVENOUS
Qty: 200 | Refills: 0 | Status: DISCONTINUED | OUTPATIENT
Start: 2017-11-27 | End: 2017-11-27

## 2017-11-27 RX ORDER — GABAPENTIN 400 MG/1
600 CAPSULE ORAL DAILY
Qty: 0 | Refills: 0 | Status: DISCONTINUED | OUTPATIENT
Start: 2017-11-27 | End: 2017-11-29

## 2017-11-27 RX ORDER — QUETIAPINE FUMARATE 200 MG/1
25 TABLET, FILM COATED ORAL THREE TIMES A DAY
Qty: 0 | Refills: 0 | Status: DISCONTINUED | OUTPATIENT
Start: 2017-11-27 | End: 2017-12-12

## 2017-11-27 RX ADMIN — CHLORHEXIDINE GLUCONATE 15 MILLILITER(S): 213 SOLUTION TOPICAL at 05:24

## 2017-11-27 RX ADMIN — PROPOFOL 6.13 MICROGRAM(S)/KG/MIN: 10 INJECTION, EMULSION INTRAVENOUS at 10:32

## 2017-11-27 RX ADMIN — Medication 400 MILLIGRAM(S): at 20:20

## 2017-11-27 RX ADMIN — PIPERACILLIN AND TAZOBACTAM 25 GRAM(S): 4; .5 INJECTION, POWDER, LYOPHILIZED, FOR SOLUTION INTRAVENOUS at 05:23

## 2017-11-27 RX ADMIN — CARVEDILOL PHOSPHATE 3.12 MILLIGRAM(S): 80 CAPSULE, EXTENDED RELEASE ORAL at 21:00

## 2017-11-27 RX ADMIN — FENTANYL CITRATE 50 MICROGRAM(S): 50 INJECTION INTRAVENOUS at 18:40

## 2017-11-27 RX ADMIN — FENTANYL CITRATE 50 MICROGRAM(S): 50 INJECTION INTRAVENOUS at 01:47

## 2017-11-27 RX ADMIN — CHLORHEXIDINE GLUCONATE 1 APPLICATION(S): 213 SOLUTION TOPICAL at 12:38

## 2017-11-27 RX ADMIN — DEXMEDETOMIDINE HYDROCHLORIDE IN 0.9% SODIUM CHLORIDE 5.04 MICROGRAM(S)/KG/HR: 4 INJECTION INTRAVENOUS at 10:32

## 2017-11-27 RX ADMIN — FENTANYL CITRATE 50 MICROGRAM(S): 50 INJECTION INTRAVENOUS at 05:35

## 2017-11-27 RX ADMIN — QUETIAPINE FUMARATE 25 MILLIGRAM(S): 200 TABLET, FILM COATED ORAL at 21:00

## 2017-11-27 RX ADMIN — FENTANYL CITRATE 50 MICROGRAM(S): 50 INJECTION INTRAVENOUS at 18:18

## 2017-11-27 RX ADMIN — PROPOFOL 6.13 MICROGRAM(S)/KG/MIN: 10 INJECTION, EMULSION INTRAVENOUS at 05:23

## 2017-11-27 RX ADMIN — SODIUM CHLORIDE 100 MILLILITER(S): 9 INJECTION INTRAMUSCULAR; INTRAVENOUS; SUBCUTANEOUS at 05:23

## 2017-11-27 RX ADMIN — FENTANYL CITRATE 50 MICROGRAM(S): 50 INJECTION INTRAVENOUS at 02:00

## 2017-11-27 RX ADMIN — PANTOPRAZOLE SODIUM 40 MILLIGRAM(S): 20 TABLET, DELAYED RELEASE ORAL at 12:37

## 2017-11-27 RX ADMIN — FENTANYL CITRATE 50 MICROGRAM(S): 50 INJECTION INTRAVENOUS at 05:50

## 2017-11-27 RX ADMIN — Medication 1000 MILLIGRAM(S): at 21:00

## 2017-11-27 RX ADMIN — BRIMONIDINE TARTRATE 1 DROP(S): 2 SOLUTION/ DROPS OPHTHALMIC at 18:17

## 2017-11-27 RX ADMIN — BRIMONIDINE TARTRATE 1 DROP(S): 2 SOLUTION/ DROPS OPHTHALMIC at 05:24

## 2017-11-27 NOTE — CONSULT NOTE ADULT - SUBJECTIVE AND OBJECTIVE BOX
Information from chart:  "Patient is a 89y old  Male who presents with a chief complaint of Coffee ground emesis, abdominal distention, and upper abdominal pain x 3 days (2017 01:47)    HPI:  89 y/o male PMHx of a-fib s/p pacer, BPH, CHF, CVA, GERD, hip fx s/p hip replacement, HTN, seed implantation for prostate cancer, kidney cyst removal c/o coffee ground emesis, abdominal distention, and severe upper abdominal pain x 3 days. Patient is a poor historian so most of history was given by daughter in law Anneliese. States that the patient's aids noticed these symptoms for the past 3 days, worsening today. Coffee ground emesis witnessed by ED RN. Patient denies pain or nausea now. Last BM was yesterday and normal. Last ate at 7pm. Patient denies fever, chills, constipation, diarrhea, hematuria, melena, hematochezia, chest pain, shortness of breath, dizziness. Patient denies prior incident. (2017 01:47)   "        PAST MEDICAL & SURGICAL HISTORY:  Hip fracture  Afib: on Coumadin  Peripheral neuropathy  CVA (cerebral infarction): no deficits  GERD (gastroesophageal reflux disease)  BPH (benign prostatic hypertrophy)  Prostate cancer  CHF (congestive heart failure)  HTN (hypertension)  H/O prostate cancer: s/p seed implantation  Status post-operative repair of hip fracture  Artificial cardiac pacemaker    FAMILY HISTORY:  No pertinent family history in first degree relatives    Allergies    No Known Allergies    Intolerances      Home Medications:  brimonidine 0.2% ophthalmic solution: 1 drop(s) to each affected eye 2 times a day (2017 20:10)  carvedilol 3.125 mg oral tablet: 1 tab(s) orally every 12 hours (2017 20:10)  Coumadin 2.5 mg oral tablet: 1 tab(s) orally once a day (at bedtime) - home (2017 20:10)  docusate sodium 100 mg oral capsule: 1 cap(s) orally 3 times a day (2017 20:10)  gabapentin 100 mg oral capsule: 3 cap(s) orally 3 times a day (2017 20:10)  Lasix 20 mg oral tablet: 1 tab(s) orally once a day (2017 20:10)  latanoprost 0.005% ophthalmic solution: 1 drop(s) to each affected eye once a day (at bedtime) (2017 20:10)  pantoprazole 40 mg oral delayed release tablet: 1 tab(s) orally once a day (before a meal) (2017 20:10)  QUEtiapine 25 mg oral tablet: 0.5 tab(s) (12.5mg) orally once a day (at bedtime) (2017 20:10)  QUEtiapine 25 mg oral tablet: 1 tab(s) orally 3 times a day (2017 20:10)  senna oral tablet: 2 tab(s) orally once a day (at bedtime) (2017 20:10)  sertraline 25 mg oral tablet: 1 tab(s) orally once a day (2017 20:10)  spironolactone 25 mg oral tablet: 1 tab(s) orally once a day (2017 20:10)  tamsulosin 0.4 mg oral capsule: 1 cap(s) orally once a day (at bedtime) (2017 20:10)  tamsulosin 0.4 mg oral capsule: 1 cap(s) orally once a day (2017 20:10)  timolol 0.5% ophthalmic solution: 1 drop(s) to each affected eye 2 times a day (2017 20:10)  warfarin 3 mg oral tablet: 1 tab(s) orally once a day (2017 20:10)    MEDICATIONS  (STANDING):  brimonidine 0.2% Ophthalmic Solution 1 Drop(s) Both EYES two times a day  chlorhexidine 0.12% Liquid 15 milliLiter(s) Swish and Spit two times a day  chlorhexidine 4% Liquid 1 Application(s) Topical daily  dexmedetomidine Infusion 0.2 MICROgram(s)/kG/Hr (5.045 mL/Hr) IV Continuous <Continuous>  pantoprazole  Injectable 40 milliGRAM(s) IV Push daily  propofol Infusion 10 MICROgram(s)/kG/Min (6.126 mL/Hr) IV Continuous <Continuous>  sodium chloride 0.9%. 1000 milliLiter(s) (100 mL/Hr) IV Continuous <Continuous>    MEDICATIONS  (PRN):  fentaNYL    Injectable 50 MICROGram(s) IV Push every 4 hours PRN Moderate Pain (4 - 6)    Vital Signs Last 24 Hrs  T(C): 36.8 (2017 07:27), Max: 37.8 (2017 15:46)  T(F): 98.2 (2017 07:27), Max: 100 (2017 15:46)  HR: 60 (2017 12:35) (60 - 62)  BP: --  BP(mean): --  RR: 15 (2017 12:00) (13 - 29)  SpO2: 99% (2017 12:35) (97% - 100%)  Mode: CPAP with PS  FiO2: 30  PEEP: 5  PS: 10  MAP: 8  PIP: 16    Daily     Daily Weight in k.7 (2017 05:00)  CAPILLARY BLOOD GLUCOSE        PHYSICAL EXAM:      T(C): 36.8 (17 @ 07:27), Max: 37.8 (17 @ 15:46)  HR: 60 (17 @ 12:35) (60 - 62)  BP: --  RR: 15 (17 @ 12:00) (13 - 29)  SpO2: 99% (17 @ 12:35) (97% - 100%)  Wt(kg): --  Respiratory: clear anteriorly, decreased BS at bases  Cardiovascular: S1 S2  Gastrointestinal: soft NT ND no BS  Extremities:   1 edema                  140  |  107  |  69<H>  ----------------------------<  121<H>  5.2   |  24  |  2.34<H>    Ca    8.0<L>      2017 04:39  Phos  3.2       Mg     2.2         TPro  8.6<H>  /  Alb  3.7  /  TBili  0.6  /  DBili  x   /  AST    /  ALT    /  AlkPhos  92                            10.5   9.7   )-----------( 198      ( 2017 04:39 )             31.9       ABG - ( 2017 08:29 )  pH: x     /  pCO2: 37    /  pO2: 99    / HCO3: 19    / Base Excess: -6.4  /  SaO2: 97              Assessment and Plan    ANDREW suspected SIRS; ATN; post op for ischemic bowel.  Continue supportive care, indices stabilizing.   Judicious IVF as obligate output dictates; keep fluid balance neutral if feasible.  Will follow.

## 2017-11-27 NOTE — PROGRESS NOTE ADULT - SUBJECTIVE AND OBJECTIVE BOX
HPI:  Pt is a 89 yo WM with h/o CHF, A fib on Coumadin, s/p PPM placement, HTN, CVA, GERD, BPH, s/p hip replacement, seed implantation for prostate Ca, kidney cyst removal and mild dementia.  Pt presented with a few days of abdominal pain, and coffee ground emesis.  Pt found to have lactic acidosis, an elevated INR, acute vs acute on CKD  and CT abd/pelvis: Portal venous gas within the liver and extensive small bowel pneumatosis concerning for ischemic bowel. Massive dilatation of stomach and numerous small bowel loops most likely related to a right inguinal hernia containing small bowel and cecum. Mild interloop fluid and mesenteric edema. The colon is collapsed. Pt was taken to OR; exp lap, BEATRIS and reduction of incarcerated hernia. Pt was left intubated and transferred to the ICU.     ## Labs:  CBC:                        10.5   9.7   )-----------( 198      ( 27 Nov 2017 04:39 )             31.9     Chem:  11-27    140  |  107  |  69<H>  ----------------------------<  121<H>  5.2   |  24  |  2.34<H>    Ca    8.0<L>      27 Nov 2017 04:39  Phos  3.2     11-27  Mg     2.2     11-27    TPro  8.6<H>  /  Alb  3.7  /  TBili  0.6  /  DBili  x   /  AST  27  /  ALT  27  /  AlkPhos  92  11-25    Coags:  PT/INR - ( 27 Nov 2017 04:39 )   PT: 14.9 sec;   INR: 1.36 ratio         PTT - ( 26 Nov 2017 21:56 )  PTT:32.7 sec    ## Medications:    fentaNYL    Injectable 50 MICROGram(s) IV Push every 4 hours PRN    ## Vitals:  T(C): 37.1 (11-27-17 @ 12:50), Max: 37.8 (11-26-17 @ 15:46)  HR: 60 (11-27-17 @ 13:30) (60 - 62)  BP: --  BP(mean): --  RR: 19 (11-27-17 @ 13:30) (13 - 29)  SpO2: 100% (11-27-17 @ 13:30) (97% - 100%)  Wt(kg): --  Vent: Mode: CPAP with PS, RR (patient): 15, FiO2: 30, PEEP: 5, PS: 10, PIP: 16  ABG: ABG - ( 27 Nov 2017 13:35 )  pH: x     /  pCO2: 35    /  pO2: 457   / HCO3: 17    / Base Excess: -8.0  /  SaO2: 100           11-26 @ 07:01  - 11-27 @ 07:00  --------------------------------------------------------  IN: 4423.4 mL / OUT: 1635 mL / NET: 2788.4 mL    11-27 @ 07:01 - 11-27 @ 15:33  --------------------------------------------------------  IN: 492.8 mL / OUT: 275 mL / NET: 217.8 mL      ## P/E:  Gen: lying comfortably in bed in no apparent distress  Mouth: (+) ETT  Lungs: R CTA/ L rhonchi  Heart: Paced rhythm  Abd:  Ext:  Neuro:    CENTRAL LINE: [ ] YES [ ] NO  LOCATION:   DATE INSERTED:  REMOVE: [ ] YES [ ] NO      SIMEON: [ ] YES [ ] NO    DATE INSERTED:  REMOVE:  [ ] YES [ ] NO      A-LINE:  [ ] YES [ ] NO  LOCATION:   DATE INSERTED:  REMOVE:  [ ] YES [ ] NO  EXPLAIN:    GLOBAL ISSUE/BEST PRACTICE:  Analgesia:  Sedation:  HOB elevation: yes  Stress ulcer prophylaxis:  VTE prophylaxis:  Oral Care:  Glycemic control:  Nutrition:    CODE STATUS: [ ] full code  [ ] DNR  [ ] DNI  [ ] [facility]  Goals of care discussion: [ ] yes HPI:  Pt is a 89 yo WM with h/o CHF, A fib on Coumadin, s/p PPM placement, HTN, CVA, GERD, BPH, s/p hip replacement, seed implantation for prostate Ca, kidney cyst removal and mild dementia.  Pt presented with a few days of abdominal pain, and coffee ground emesis.  Pt found to have lactic acidosis, an elevated INR, acute vs acute on CKD  and CT abd/pelvis: Portal venous gas within the liver and extensive small bowel pneumatosis concerning for ischemic bowel. Massive dilatation of stomach and numerous small bowel loops most likely related to a right inguinal hernia containing small bowel and cecum. Mild interloop fluid and mesenteric edema. The colon is collapsed. Pt was taken to OR; exp lap, BEATRIS and reduction of incarcerated hernia. Pt was left intubated and transferred to the ICU.     ## Labs:  CBC:                        10.5   9.7   )-----------( 198      ( 27 Nov 2017 04:39 )             31.9     Chem:  11-27    140  |  107  |  69<H>  ----------------------------<  121<H>  5.2   |  24  |  2.34<H>    Ca    8.0<L>      27 Nov 2017 04:39  Phos  3.2     11-27  Mg     2.2     11-27    TPro  8.6<H>  /  Alb  3.7  /  TBili  0.6  /  DBili  x   /  AST  27  /  ALT  27  /  AlkPhos  92  11-25    Coags:  PT/INR - ( 27 Nov 2017 04:39 )   PT: 14.9 sec;   INR: 1.36 ratio         PTT - ( 26 Nov 2017 21:56 )  PTT:32.7 sec    ## Medications:    fentaNYL    Injectable 50 MICROGram(s) IV Push every 4 hours PRN    ## Vitals:  T(C): 37.1 (11-27-17 @ 12:50), Max: 37.8 (11-26-17 @ 15:46)  HR: 60 (11-27-17 @ 13:30) (60 - 62)  BP: --  BP(mean): --  RR: 19 (11-27-17 @ 13:30) (13 - 29)  SpO2: 100% (11-27-17 @ 13:30) (97% - 100%)  Wt(kg): --  Vent: Mode: CPAP with PS, RR (patient): 15, FiO2: 30, PEEP: 5, PS: 10, PIP: 16  ABG: ABG - ( 27 Nov 2017 13:35 )  pH: x     /  pCO2: 35    /  pO2: 457   / HCO3: 17    / Base Excess: -8.0  /  SaO2: 100           11-26 @ 07:01  - 11-27 @ 07:00  --------------------------------------------------------  IN: 4423.4 mL / OUT: 1635 mL / NET: 2788.4 mL    11-27 @ 07:01 - 11-27 @ 15:33  --------------------------------------------------------  IN: 492.8 mL / OUT: 275 mL / NET: 217.8 mL      ## P/E:  Gen: lying comfortably in bed in no apparent distress  Mouth: (+) ETT  Lungs: R CTA/ L rhonchi  Heart: Paced rhythm  Abd: Midline dressing with abd binder  Ext: Dependent edema  Neuro: Sedated    CENTRAL LINE: [ ] YES [ ] NO  LOCATION:   DATE INSERTED:  REMOVE: [ ] YES [ ] NO      SIMEON: [x ] YES [ ] NO    DATE INSERTED:  REMOVE:  [ ] YES [ ] NO      A-LINE:  [ ] YES [ ] NO  LOCATION:   DATE INSERTED:  REMOVE:  [ ] YES [ ] NO  EXPLAIN:    CODE STATUS: [x ] full code  [ ] DNR  [ ] DNI  [ ] [facility]  Goals of care discussion: [ ] yes

## 2017-11-27 NOTE — PROGRESS NOTE ADULT - SUBJECTIVE AND OBJECTIVE BOX
INTERVAL HPI/OVERNIGHT EVENTS:  Pt. seen and examined at bedside resting comfortably. Intubated and sedated. Responds to verbal stimuli and can follow commands per RN when off sedation. No complaints expressed.     Vital Signs Last 24 Hrs  T(C): 37.3 (26 Nov 2017 23:30), Max: 37.8 (26 Nov 2017 15:46)  T(F): 99.2 (26 Nov 2017 23:30), Max: 100 (26 Nov 2017 15:46)  HR: 60 (27 Nov 2017 05:00) (60 - 66)  BP: 119/50 (26 Nov 2017 08:30) (119/50 - 183/890)  BP(mean): 68 (26 Nov 2017 08:30) (68 - 106)  RR: 13 (27 Nov 2017 05:00) (11 - 25)  SpO2: 100% (27 Nov 2017 05:00) (97% - 100%)    PHYSICAL EXAM:    GENERAL: NAD. Responds to verbal stimuli. Intubated and sedated. NGT in place with very dark output (700cc/24hr)  CHEST/LUNG: Clear breath sounds anteriorly.  HEART: +S1S2  ABDOMEN: Abdominal binder; Aquacel dressing with small amt. postop blood stain noted. + Quiet BS. softly distended, Mildly tender to incisional area, no guarding  : Sharp in place draining clear yellow urine-- about 500cc/12hr shift  EXTREMITIES:  calf soft, non tender     I&O's Detail    25 Nov 2017 07:01  -  26 Nov 2017 07:00  --------------------------------------------------------  IN:    propofol Infusion: 6 mL  Total IN: 6 mL    OUT:    Indwelling Catheter - Urethral: 175 mL  Total OUT: 175 mL    Total NET: -169 mL      26 Nov 2017 07:01  -  27 Nov 2017 05:26  --------------------------------------------------------  IN:    FFP (Fresh Frozen Plasma): 546 mL    IV PiggyBack: 350 mL    lactated ringers.: 1000 mL    propofol Infusion: 289.2 mL    sodium chloride 0.9%.: 1690 mL  Total IN: 3875.2 mL    OUT:    Indwelling Catheter - Urethral: 705 mL    Nasoenteral Tube: 500 mL  Total OUT: 1205 mL    Total NET: 2670.2 mL    LABS:                        10.5   9.7   )-----------( 198      ( 27 Nov 2017 04:39 )             31.9     11-27    140  |  107  |  69<H>  ----------------------------<  121<H>  5.2   |  24  |  2.34<H>    Ca    8.0<L>      27 Nov 2017 04:39  Phos  3.2     11-27  Mg     2.2     11-27    TPro  8.6<H>  /  Alb  3.7  /  TBili  0.6  /  DBili  x   /  AST  27  /  ALT  27  /  AlkPhos  92  11-25    PT/INR - ( 26 Nov 2017 21:56 )   PT: 18.9 sec;   INR: 1.71 ratio      Assessment: 89 y/o male PMHx of a-fib s/p pacer, BPH, CHF, CVA, GERD, hip fx s/p hip replacement, HTN, seed implantation for prostate cancer admitted with ischemic bowel 2/2 incarcerated R inguinal hernia S/P Ex lap, BEATRIS, reduction of incarcerated R inguinal hernia POD#1.  Lactate elevated but trending down.     Plan:  - NPO, NGT-- monitor output and quality, await GI function  - local wound care, abdominal binder  - DVT prophylaxis, pain control  - IVF  - cont. sharp-- monitor I&Os  - follow labs, trend lactate  - continue current management per CCU  - will discuss with surgical attending

## 2017-11-28 LAB
ANION GAP SERPL CALC-SCNC: 7 MMOL/L — SIGNIFICANT CHANGE UP (ref 5–17)
BUN SERPL-MCNC: 64 MG/DL — HIGH (ref 7–23)
CALCIUM SERPL-MCNC: 7.8 MG/DL — LOW (ref 8.5–10.1)
CHLORIDE SERPL-SCNC: 111 MMOL/L — HIGH (ref 96–108)
CO2 SERPL-SCNC: 25 MMOL/L — SIGNIFICANT CHANGE UP (ref 22–31)
CREAT SERPL-MCNC: 1.97 MG/DL — HIGH (ref 0.5–1.3)
GLUCOSE SERPL-MCNC: 105 MG/DL — HIGH (ref 70–99)
HCT VFR BLD CALC: 30.7 % — LOW (ref 39–50)
HGB BLD-MCNC: 9.9 G/DL — LOW (ref 13–17)
INR BLD: 1.15 RATIO — SIGNIFICANT CHANGE UP (ref 0.88–1.16)
LACTATE SERPL-SCNC: 1.2 MMOL/L — SIGNIFICANT CHANGE UP (ref 0.7–2)
MAGNESIUM SERPL-MCNC: 2.3 MG/DL — SIGNIFICANT CHANGE UP (ref 1.6–2.6)
MCHC RBC-ENTMCNC: 32.1 GM/DL — SIGNIFICANT CHANGE UP (ref 32–36)
MCHC RBC-ENTMCNC: 32.4 PG — SIGNIFICANT CHANGE UP (ref 27–34)
MCV RBC AUTO: 100.7 FL — HIGH (ref 80–100)
PHOSPHATE SERPL-MCNC: 3.2 MG/DL — SIGNIFICANT CHANGE UP (ref 2.5–4.5)
PLATELET # BLD AUTO: 172 K/UL — SIGNIFICANT CHANGE UP (ref 150–400)
POTASSIUM SERPL-MCNC: 4.5 MMOL/L — SIGNIFICANT CHANGE UP (ref 3.5–5.3)
POTASSIUM SERPL-SCNC: 4.5 MMOL/L — SIGNIFICANT CHANGE UP (ref 3.5–5.3)
PROTHROM AB SERPL-ACNC: 12.6 SEC — SIGNIFICANT CHANGE UP (ref 9.8–12.7)
RBC # BLD: 3.05 M/UL — LOW (ref 4.2–5.8)
RBC # FLD: 13.6 % — SIGNIFICANT CHANGE UP (ref 11–15)
SODIUM SERPL-SCNC: 143 MMOL/L — SIGNIFICANT CHANGE UP (ref 135–145)
WBC # BLD: 10.8 K/UL — HIGH (ref 3.8–10.5)
WBC # FLD AUTO: 10.8 K/UL — HIGH (ref 3.8–10.5)

## 2017-11-28 PROCEDURE — 99233 SBSQ HOSP IP/OBS HIGH 50: CPT

## 2017-11-28 RX ORDER — HEPARIN SODIUM 5000 [USP'U]/ML
5000 INJECTION INTRAVENOUS; SUBCUTANEOUS EVERY 12 HOURS
Qty: 0 | Refills: 0 | Status: DISCONTINUED | OUTPATIENT
Start: 2017-11-28 | End: 2017-12-05

## 2017-11-28 RX ORDER — BENZOCAINE AND MENTHOL 5; 1 G/100ML; G/100ML
1 LIQUID ORAL EVERY 4 HOURS
Qty: 0 | Refills: 0 | Status: DISCONTINUED | OUTPATIENT
Start: 2017-11-28 | End: 2017-12-12

## 2017-11-28 RX ORDER — WARFARIN SODIUM 2.5 MG/1
2 TABLET ORAL ONCE
Qty: 0 | Refills: 0 | Status: COMPLETED | OUTPATIENT
Start: 2017-11-28 | End: 2017-11-28

## 2017-11-28 RX ADMIN — WARFARIN SODIUM 2 MILLIGRAM(S): 2.5 TABLET ORAL at 21:43

## 2017-11-28 RX ADMIN — FENTANYL CITRATE 50 MICROGRAM(S): 50 INJECTION INTRAVENOUS at 13:20

## 2017-11-28 RX ADMIN — FENTANYL CITRATE 50 MICROGRAM(S): 50 INJECTION INTRAVENOUS at 12:50

## 2017-11-28 RX ADMIN — QUETIAPINE FUMARATE 25 MILLIGRAM(S): 200 TABLET, FILM COATED ORAL at 06:15

## 2017-11-28 RX ADMIN — BENZOCAINE AND MENTHOL 1 LOZENGE: 5; 1 LIQUID ORAL at 16:22

## 2017-11-28 RX ADMIN — CARVEDILOL PHOSPHATE 3.12 MILLIGRAM(S): 80 CAPSULE, EXTENDED RELEASE ORAL at 06:15

## 2017-11-28 RX ADMIN — QUETIAPINE FUMARATE 25 MILLIGRAM(S): 200 TABLET, FILM COATED ORAL at 21:43

## 2017-11-28 RX ADMIN — BRIMONIDINE TARTRATE 1 DROP(S): 2 SOLUTION/ DROPS OPHTHALMIC at 17:04

## 2017-11-28 RX ADMIN — CHLORHEXIDINE GLUCONATE 1 APPLICATION(S): 213 SOLUTION TOPICAL at 17:05

## 2017-11-28 RX ADMIN — GABAPENTIN 600 MILLIGRAM(S): 400 CAPSULE ORAL at 12:46

## 2017-11-28 RX ADMIN — BRIMONIDINE TARTRATE 1 DROP(S): 2 SOLUTION/ DROPS OPHTHALMIC at 06:15

## 2017-11-28 RX ADMIN — HEPARIN SODIUM 5000 UNIT(S): 5000 INJECTION INTRAVENOUS; SUBCUTANEOUS at 18:11

## 2017-11-28 RX ADMIN — CARVEDILOL PHOSPHATE 3.12 MILLIGRAM(S): 80 CAPSULE, EXTENDED RELEASE ORAL at 17:04

## 2017-11-28 RX ADMIN — QUETIAPINE FUMARATE 25 MILLIGRAM(S): 200 TABLET, FILM COATED ORAL at 16:22

## 2017-11-28 RX ADMIN — SODIUM CHLORIDE 100 MILLILITER(S): 9 INJECTION INTRAMUSCULAR; INTRAVENOUS; SUBCUTANEOUS at 06:16

## 2017-11-28 NOTE — PROGRESS NOTE ADULT - SUBJECTIVE AND OBJECTIVE BOX
INTERVAL HPI/OVERNIGHT EVENTS:  Pt. seen and examined at bedside. Patient seen agitated and confused yelling at RN in ICU. Patient c/o restraints, has been trying to pull off abdominal binder. Denies any abdominal pain or other complaints at this time. Tolerating NGT. Denies N/V. Denies Flatus or BM yet.     Vital Signs Last 24 Hrs  T(C): 37.8 (27 Nov 2017 15:30), Max: 37.8 (27 Nov 2017 15:30)  T(F): 100 (27 Nov 2017 15:30), Max: 100 (27 Nov 2017 15:30)  HR: 62 (28 Nov 2017 00:00) (53 - 73)  BP: --  RR: 21 (28 Nov 2017 00:00) (13 - 30)  SpO2: 100% (28 Nov 2017 00:00) (84% - 100%)    PHYSICAL EXAM:    GENERAL: NAD  HEAD:  NGT in place with 50cc dark green output seen in canister.   CHEST/LUNG: Clear to ausculation, bilaterally   HEART: +S1S2  ABDOMEN: Abdominal binder taken off to reveal half of aquacel dressing falling off. Aquacel with dry blood stain noted, removed to reveal midline abdominal incision clean with staples intact. Slight ooze from umbilicus that stopped with pressure. Incision irrigated with NS. New DSD applied. Patient tolerated well. No BS. Softly distended. Nontender. No guarding.   : sharp in place draining clear yellow urine. (Urine output picking up)  EXTREMITIES:  calf soft, non tender     I&O's Detail    26 Nov 2017 07:01  -  27 Nov 2017 07:00  --------------------------------------------------------  IN:    FFP (Fresh Frozen Plasma): 546 mL    IV PiggyBack: 350 mL    lactated ringers.: 1000 mL    propofol Infusion: 337.4 mL    sodium chloride 0.9%.: 2190 mL  Total IN: 4423.4 mL    OUT:    Indwelling Catheter - Urethral: 835 mL    Nasoenteral Tube: 800 mL  Total OUT: 1635 mL    Total NET: 2788.4 mL      27 Nov 2017 07:01  -  28 Nov 2017 01:09  --------------------------------------------------------  IN:    dexmedetomidine Infusion: 25 mL    propofol Infusion: 97.1 mL    sodium chloride 0.9%.: 1800 mL  Total IN: 1922.1 mL    OUT:    Indwelling Catheter - Urethral: 1320 mL    Nasoenteral Tube: 100 mL  Total OUT: 1420 mL    Total NET: 502.1 mL    LABS:                        10.5   9.7   )-----------( 198      ( 27 Nov 2017 04:39 )             31.9     11-27    140  |  107  |  69<H>  ----------------------------<  121<H>  5.2   |  24  |  2.34<H>    Ca    8.0<L>      27 Nov 2017 04:39  Phos  3.2     11-27  Mg     2.2     11-27      PT/INR - ( 27 Nov 2017 04:39 )   PT: 14.9 sec;   INR: 1.36 ratio    PTT - ( 26 Nov 2017 21:56 )  PTT:32.7 sec    Assessment: 89 y/o male PMHx of a-fib s/p pacer, BPH, CHF, CVA, GERD, hip fx s/p hip replacement, HTN, seed implantation for prostate cancer admitted with ischemic bowel 2/2 incarcerated R inguinal hernia S/P Ex lap, BEATRIS, reduction of incarcerated R inguinal hernia POD#2.   Patient stable post extubation yesterday. Dressing changed.  Lactate elevated but trending down.     Plan:  - NPO, NGT-- monitor output and quality, await GI function  - local wound care, abdominal binder  - DVT prophylaxis, pain control  - IVF  - cont. sharp-- monitor I&Os  - follow labs, trend lactate  - continue current management per CCU  - will discuss with surgical attending

## 2017-11-28 NOTE — PROGRESS NOTE ADULT - SUBJECTIVE AND OBJECTIVE BOX
HPI:  Pt is a 89 yo WM with h/o CHF, A fib on Coumadin, s/p PPM placement, HTN, CVA, GERD, BPH, s/p hip replacement, seed implantation for prostate Ca, kidney cyst removal and mild dementia.  Pt presented with a few days of abdominal pain, and coffee ground emesis.  Pt found to have lactic acidosis, an elevated INR, acute vs acute on CKD  and CT abd/pelvis: Portal venous gas within the liver and extensive small bowel pneumatosis concerning for ischemic bowel. Massive dilatation of stomach and numerous small bowel loops most likely related to a right inguinal hernia containing small bowel and cecum. Mild interloop fluid and mesenteric edema. The colon is collapsed. Pt was taken to OR; exp lap, BEATRIS and reduction of incarcerated hernia. Pt was left intubated and transferred to the ICU. s/p extubation 11/27      ## Labs:  CBC:                        9.9    10.8  )-----------( 172      ( 28 Nov 2017 04:05 )             30.7     Chem:  11-28    143  |  111<H>  |  64<H>  ----------------------------<  105<H>  4.5   |  25  |  1.97<H>    Ca    7.8<L>      28 Nov 2017 04:05  Phos  3.2     11-28  Mg     2.3     11-28      Coags:  PT/INR - ( 28 Nov 2017 04:05 )   PT: 12.6 sec;   INR: 1.15 ratio         PTT - ( 26 Nov 2017 21:56 )  PTT:32.7 sec      ## Medications:    carvedilol 3.125 milliGRAM(s) Oral every 12 hours      heparin  Injectable 5000 Unit(s) SubCutaneous every 12 hours  warfarin 2 milliGRAM(s) Oral once    fentaNYL    Injectable 50 MICROGram(s) IV Push every 4 hours PRN  gabapentin 600 milliGRAM(s) Oral daily  QUEtiapine 25 milliGRAM(s) Oral three times a day    ## Vitals:  T(C): 37.1 (11-28-17 @ 15:19), Max: 37.1 (11-27-17 @ 23:00)  HR: 64 (11-28-17 @ 16:12) (53 - 76)  BP: 128/59 (11-28-17 @ 16:12) (128/59 - 134/63)  BP(mean): --  RR: 19 (11-28-17 @ 16:12) (15 - 30)  SpO2: 98% (11-28-17 @ 16:12) (84% - 100%)  Wt(kg): --  Vent:   ABG: ABG - ( 27 Nov 2017 13:35 )  pH: x     /  pCO2: 35    /  pO2: 457   / HCO3: 17    / Base Excess: -8.0  /  SaO2: 100         11-27 @ 07:01  -  11-28 @ 07:00  --------------------------------------------------------  IN: 2522.1 mL / OUT: 1845 mL / NET: 677.1 mL    11-28 @ 07:01  -  11-28 @ 17:45  --------------------------------------------------------  IN: 1550 mL / OUT: 950 mL / NET: 600 mL      ## P/E:  Gen: lying comfortably in bed in no apparent distress  Lungs: CTA  Heart: Paced  Abd: Midline dressing with abd binder  Ext: Dependent edema  Neuro: Awake, slightly confused    CENTRAL LINE: [ ] YES [ x] NO  LOCATION:   DATE INSERTED:  REMOVE: [ ] YES [ ] NO      HENDRIX: [x ] YES [ ] NO    DATE INSERTED:  REMOVE:  [x ] YES [ ] NO      A-LINE:  [x ] YES [ ] NO  LOCATION:   DATE INSERTED:  REMOVE:  [x ] YES [ ] NO  EXPLAIN:    CODE STATUS: [ ] full code  [x ] DNR  [ ] DNI  [ ] [facility]  Goals of care discussion: [ ] yes

## 2017-11-28 NOTE — PHYSICAL THERAPY INITIAL EVALUATION ADULT - GENERAL OBSERVATIONS, REHAB EVAL
Pt encountered supine in bed, alert and oriented x2, IV, NG tube, cardiac monitor, abdominal binder, venodyne pumps, supplemental oxygen via nasal canula in place, NAD. Pt is s/p ex lap POD #2.

## 2017-11-28 NOTE — DIETITIAN INITIAL EVALUATION ADULT. - OTHER INFO
Pt seen due RN consult for do you have any questions about your prescribed diet.  Pt's grand daughters were unable to provide diet hx for pt.  Pt s/p surgery 11-26-17, reduction of incarcerated hernia, exploratory laparotomy, lysis of adhesions, NGT in place, clear fluid diet ordered today, c minimal oral intake at present.

## 2017-11-28 NOTE — DIETITIAN INITIAL EVALUATION ADULT. - ENERGY NEEDS
Height (cm): 177.8 (11-25)  Weight (kg): 100.9 (11-26)  BMI (kg/m2): 31.9 (11-26)  IBW:  75.2 kg    % IBW: 134%      UBW:  ?         %UBW: ?  wt. gain of 5. 4 kg noted, c 1+ depenedent, generalized edema, I/O: 2522.1/1845(+677.1)

## 2017-11-28 NOTE — DIETITIAN INITIAL EVALUATION ADULT. - NS AS NUTRI INTERV MEALS SNACK
Texture-modified diet/Mineral - modified diet/Recommend advance diet as tolerated to full liquids to low sodium, low fiber, Dysphagia 2 Mechanical Soft - Thin Liquids

## 2017-11-28 NOTE — DIETITIAN INITIAL EVALUATION ADULT. - FACTORS AFF FOOD INTAKE
abdominal pain, vomiting dark tarry vomitus  x 3 days PTA noted, pt w/o upper dentures in place, c own teeth on the bottom c some missing teeth/difficulty chewing/other (specify)

## 2017-11-28 NOTE — DIETITIAN INITIAL EVALUATION ADULT. - PERTINENT LABORATORY DATA
11-28 Na143 mmol/L Glu 105 mg/dL<H> K+ 4.5 mmol/L Cr  1.97 mg/dL<H> BUN 64 mg/dL<H>  Est GFR 33 mL/min/1.73M2<L>Phos 3.2 mg/dL Ca 7.8 mg/dL<L> Alb n/a  Hgb 9.9 g/dL<L> Hct 30.7 %<L> 11-25 bun 65 mg/dL<H> Cr 2.60 mg/dL<H> K 5.5 mmol/L<H> Glucose 197  mg/dL<H> Alb 3.7 g/dL

## 2017-11-28 NOTE — PHYSICAL THERAPY INITIAL EVALUATION ADULT - MODIFIED CLINICAL TEST OF SENSORY INTEGRATION IN BALANCE TEST
Barthel Index: Feeding Score _0__, Bathing Score _0__, Grooming Score _0__, Dressing Score __0_, Bowels Score _0__, Bladder Score _0__, Toilet Score _0__, Transfers Score _0__, Mobility Score __0_, Stairs Score 0___,     Total Score __0_

## 2017-11-28 NOTE — CHART NOTE - NSCHARTNOTEFT_GEN_A_CORE
Pt medically stable for transfer to medical floor.  Signed out to Dr. Starks    Pt is a 89 yo WM with h/o CHF, A fib on Coumadin, s/p PPM placement, HTN, CVA, GERD, BPH, s/p hip replacement, seed implantation for prostate Ca, kidney cyst removal and mild dementia.  Pt presented with a few days of abdominal pain, and coffee ground emesis.  Pt found to have lactic acidosis, an elevated INR, acute vs acute on CKD  and CT concerning for ischemic bowel.  Pt was taken to OR; exp lap, BEATRIS and reduction of incarcerated hernia. Pt was left intubated and transferred to the ICU.     Pt extubated the following day and doing well.  NGT will be left in place for now to LWS.  Pt started initially on clear liquid diet however unable to tolerate.  Now NPO again.      Will continue IVF hydration.  Coumadin ordered for tonight  Pt is DNR

## 2017-11-28 NOTE — PHYSICAL THERAPY INITIAL EVALUATION ADULT - ADDITIONAL COMMENTS
Pt has home health aide 12 hours x 7 days to assist with ADLs, self care. Pt ambulates household distances with a rolling walker. There are 2 steps to enter home with handrail, pt required supervision/assistance to negotiate. Pt ambulation limited secondary to R hip/LE pain.

## 2017-11-29 ENCOUNTER — TRANSCRIPTION ENCOUNTER (OUTPATIENT)
Age: 82
End: 2017-11-29

## 2017-11-29 DIAGNOSIS — F03.90 UNSPECIFIED DEMENTIA WITHOUT BEHAVIORAL DISTURBANCE: ICD-10-CM

## 2017-11-29 DIAGNOSIS — I10 ESSENTIAL (PRIMARY) HYPERTENSION: ICD-10-CM

## 2017-11-29 LAB
ANION GAP SERPL CALC-SCNC: 11 MMOL/L — SIGNIFICANT CHANGE UP (ref 5–17)
BUN SERPL-MCNC: 57 MG/DL — HIGH (ref 7–23)
CALCIUM SERPL-MCNC: 7.8 MG/DL — LOW (ref 8.5–10.1)
CHLORIDE SERPL-SCNC: 113 MMOL/L — HIGH (ref 96–108)
CO2 SERPL-SCNC: 21 MMOL/L — LOW (ref 22–31)
CREAT SERPL-MCNC: 1.54 MG/DL — HIGH (ref 0.5–1.3)
GLUCOSE SERPL-MCNC: 92 MG/DL — SIGNIFICANT CHANGE UP (ref 70–99)
HCT VFR BLD CALC: 29 % — LOW (ref 39–50)
HGB BLD-MCNC: 9.2 G/DL — LOW (ref 13–17)
INR BLD: 1.11 RATIO — SIGNIFICANT CHANGE UP (ref 0.88–1.16)
MAGNESIUM SERPL-MCNC: 2.4 MG/DL — SIGNIFICANT CHANGE UP (ref 1.6–2.6)
MCHC RBC-ENTMCNC: 31.8 PG — SIGNIFICANT CHANGE UP (ref 27–34)
MCHC RBC-ENTMCNC: 32 GM/DL — SIGNIFICANT CHANGE UP (ref 32–36)
MCV RBC AUTO: 99.6 FL — SIGNIFICANT CHANGE UP (ref 80–100)
NT-PROBNP SERPL-SCNC: HIGH PG/ML (ref 0–450)
PHOSPHATE SERPL-MCNC: 2.5 MG/DL — SIGNIFICANT CHANGE UP (ref 2.5–4.5)
PLATELET # BLD AUTO: 196 K/UL — SIGNIFICANT CHANGE UP (ref 150–400)
POTASSIUM SERPL-MCNC: 4.2 MMOL/L — SIGNIFICANT CHANGE UP (ref 3.5–5.3)
POTASSIUM SERPL-SCNC: 4.2 MMOL/L — SIGNIFICANT CHANGE UP (ref 3.5–5.3)
PROTHROM AB SERPL-ACNC: 12.1 SEC — SIGNIFICANT CHANGE UP (ref 9.8–12.7)
RBC # BLD: 2.91 M/UL — LOW (ref 4.2–5.8)
RBC # FLD: 13.8 % — SIGNIFICANT CHANGE UP (ref 11–15)
SODIUM SERPL-SCNC: 145 MMOL/L — SIGNIFICANT CHANGE UP (ref 135–145)
TROPONIN I SERPL-MCNC: 2.1 NG/ML — HIGH (ref 0.01–0.04)
WBC # BLD: 10.2 K/UL — SIGNIFICANT CHANGE UP (ref 3.8–10.5)
WBC # FLD AUTO: 10.2 K/UL — SIGNIFICANT CHANGE UP (ref 3.8–10.5)

## 2017-11-29 PROCEDURE — 71010: CPT | Mod: 26

## 2017-11-29 RX ORDER — FUROSEMIDE 40 MG
20 TABLET ORAL ONCE
Qty: 0 | Refills: 0 | Status: COMPLETED | OUTPATIENT
Start: 2017-11-29 | End: 2017-11-29

## 2017-11-29 RX ORDER — IPRATROPIUM/ALBUTEROL SULFATE 18-103MCG
3 AEROSOL WITH ADAPTER (GRAM) INHALATION ONCE
Qty: 0 | Refills: 0 | Status: COMPLETED | OUTPATIENT
Start: 2017-11-29 | End: 2017-11-29

## 2017-11-29 RX ORDER — GABAPENTIN 400 MG/1
300 CAPSULE ORAL DAILY
Qty: 0 | Refills: 0 | Status: DISCONTINUED | OUTPATIENT
Start: 2017-11-29 | End: 2017-12-12

## 2017-11-29 RX ORDER — ASPIRIN/CALCIUM CARB/MAGNESIUM 324 MG
81 TABLET ORAL DAILY
Qty: 0 | Refills: 0 | Status: DISCONTINUED | OUTPATIENT
Start: 2017-11-29 | End: 2017-12-12

## 2017-11-29 RX ORDER — WARFARIN SODIUM 2.5 MG/1
2 TABLET ORAL ONCE
Qty: 0 | Refills: 0 | Status: COMPLETED | OUTPATIENT
Start: 2017-11-29 | End: 2017-11-29

## 2017-11-29 RX ADMIN — QUETIAPINE FUMARATE 25 MILLIGRAM(S): 200 TABLET, FILM COATED ORAL at 06:20

## 2017-11-29 RX ADMIN — BRIMONIDINE TARTRATE 1 DROP(S): 2 SOLUTION/ DROPS OPHTHALMIC at 17:46

## 2017-11-29 RX ADMIN — HEPARIN SODIUM 5000 UNIT(S): 5000 INJECTION INTRAVENOUS; SUBCUTANEOUS at 17:46

## 2017-11-29 RX ADMIN — HEPARIN SODIUM 5000 UNIT(S): 5000 INJECTION INTRAVENOUS; SUBCUTANEOUS at 06:20

## 2017-11-29 RX ADMIN — CARVEDILOL PHOSPHATE 3.12 MILLIGRAM(S): 80 CAPSULE, EXTENDED RELEASE ORAL at 17:46

## 2017-11-29 RX ADMIN — CARVEDILOL PHOSPHATE 3.12 MILLIGRAM(S): 80 CAPSULE, EXTENDED RELEASE ORAL at 06:20

## 2017-11-29 RX ADMIN — QUETIAPINE FUMARATE 25 MILLIGRAM(S): 200 TABLET, FILM COATED ORAL at 14:10

## 2017-11-29 RX ADMIN — WARFARIN SODIUM 2 MILLIGRAM(S): 2.5 TABLET ORAL at 22:05

## 2017-11-29 RX ADMIN — GABAPENTIN 600 MILLIGRAM(S): 400 CAPSULE ORAL at 13:11

## 2017-11-29 RX ADMIN — QUETIAPINE FUMARATE 25 MILLIGRAM(S): 200 TABLET, FILM COATED ORAL at 22:05

## 2017-11-29 RX ADMIN — Medication 3 MILLILITER(S): at 05:44

## 2017-11-29 RX ADMIN — Medication 20 MILLIGRAM(S): at 06:19

## 2017-11-29 RX ADMIN — SODIUM CHLORIDE 70 MILLILITER(S): 9 INJECTION INTRAMUSCULAR; INTRAVENOUS; SUBCUTANEOUS at 08:29

## 2017-11-29 RX ADMIN — BRIMONIDINE TARTRATE 1 DROP(S): 2 SOLUTION/ DROPS OPHTHALMIC at 06:20

## 2017-11-29 RX ADMIN — Medication 81 MILLIGRAM(S): at 13:11

## 2017-11-29 NOTE — CONSULT NOTE ADULT - PROBLEM SELECTOR RECOMMENDATION 3
chronic. patient started on coumadin on 11-28. monitor and admister coumadin daily for Therapeutic INR 2-3.

## 2017-11-29 NOTE — PROGRESS NOTE ADULT - SUBJECTIVE AND OBJECTIVE BOX
INTERVAL HPI/OVERNIGHT EVENTS:  Pt. seen and examined at bedside for complaint of wheezing this morning per RN, patient O2 saturation 96% on nasal cannula. Patient states he feels sob, "difficult to catch breath." Abdominal pain well controlled with pain meds, requesting for water. Still NPO, pulled out his own NGT last night, no N/V.   Denies flatus yet.   Sparks removed yesterday around 2pm per RN.   Denies fever/chills, cp, palpitations, dizziness, dysuria.     Vital Signs Last 24 Hrs  T(C): 36.6 (29 Nov 2017 00:00), Max: 37.1 (28 Nov 2017 06:00)  T(F): 97.8 (29 Nov 2017 00:00), Max: 98.8 (28 Nov 2017 06:00)  HR: 60 (29 Nov 2017 00:00) (60 - 76)  BP: 108/45 (29 Nov 2017 00:00) (108/45 - 146/56)  BP(mean): 72 (28 Nov 2017 16:00) (72 - 72)  RR: 20 (29 Nov 2017 00:00) (15 - 26)  SpO2: 94% (29 Nov 2017 00:00) (94% - 100%)    PHYSICAL EXAM:  GENERAL: Demented, confused, Laying in bed in slight distress due to work of breathing.  CHEST/LUNG: Clear breath sounds b/l, no wheezes auscultated.   HEART: +S1S2, RRR  ABDOMEN: Obese, Abdominal binder in situ. Dressing C/D/I; + Quiet BS. Softly distended. Nontender. No guarding.   EXTREMITIES: calf soft, non tender     I&O's Detail    27 Nov 2017 07:01  -  28 Nov 2017 07:00  --------------------------------------------------------  IN:    dexmedetomidine Infusion: 25 mL    propofol Infusion: 97.1 mL    sodium chloride 0.9%.: 2400 mL  Total IN: 2522.1 mL    OUT:    Indwelling Catheter - Urethral: 1695 mL    Nasoenteral Tube: 150 mL  Total OUT: 1845 mL    Total NET: 677.1 mL      28 Nov 2017 07:01  -  29 Nov 2017 05:04  --------------------------------------------------------  IN:    Oral Fluid: 550 mL    sodium chloride 0.9%.: 1000 mL  Total IN: 1550 mL    OUT:    Indwelling Catheter - Urethral: 950 mL  Total OUT: 950 mL    Total NET: 600 mL      LABS:                        9.9    10.8  )-----------( 172      ( 28 Nov 2017 04:05 )             30.7     11-28    143  |  111<H>  |  64<H>  ----------------------------<  105<H>  4.5   |  25  |  1.97<H>    Ca    7.8<L>      28 Nov 2017 04:05  Phos  3.2     11-28  Mg     2.3     11-28      PT/INR - ( 28 Nov 2017 04:05 )   PT: 12.6 sec;   INR: 1.15 ratio      Assessment: 89 y/o male PMHx of a-fib s/p pacer, BPH, CHF, CVA, GERD, hip fx s/p hip replacement, HTN, seed implantation for prostate cancer admitted with ischemic bowel 2/2 incarcerated R inguinal hernia S/P Ex lap, BEATRIS, reduction of incarcerated R inguinal hernia POD#3, postop with elevated lactate (resolved) and ANDREW (improving). NGT pulled out last night, no N/V.  Now with SOB this morning, maintaining good O2 saturation on nasal cannula.     Plan:  - Duoneb treatment now, decreased IVF, Lasix 20mg IVP x1  - F/U CXR  - medicine consult  - NPO--await GI function  - coumadin resumed, trend INR  - local wound care, abdominal binder, dressing to be changed later today  - DVT prophylaxis, pain control  - Monitor I&Os, urine output  - follow up am labs  - continue current management per CCU  - will discuss with surgical attending INTERVAL HPI/OVERNIGHT EVENTS:  Pt. seen and examined at bedside for complaint of wheezing this morning per RN, patient O2 saturation 96% on nasal cannula. Patient states he feels sob, "difficult to catch breath." Abdominal pain well controlled with pain meds, requesting for water. Still NPO, pulled out his own NGT last night, no N/V.   Denies flatus yet.   Sparks removed yesterday around 2pm per RN.   Denies fever/chills, cp, palpitations, dizziness, dysuria.     Vital Signs Last 24 Hrs  T(C): 36.6 (29 Nov 2017 00:00), Max: 37.1 (28 Nov 2017 06:00)  T(F): 97.8 (29 Nov 2017 00:00), Max: 98.8 (28 Nov 2017 06:00)  HR: 60 (29 Nov 2017 00:00) (60 - 76)  BP: 108/45 (29 Nov 2017 00:00) (108/45 - 146/56)  BP(mean): 72 (28 Nov 2017 16:00) (72 - 72)  RR: 20 (29 Nov 2017 00:00) (15 - 26)  SpO2: 94% (29 Nov 2017 00:00) (94% - 100%)    PHYSICAL EXAM:  GENERAL: Demented, confused, Laying in bed in slight distress due to work of breathing.  CHEST/LUNG: Clear breath sounds anteriorly. Decreased BS at bases b/l, no wheezes auscultated.   HEART: +S1S2, RRR  ABDOMEN: Obese, Abdominal binder in situ. Dressing C/D/I; + Quiet BS. Softly distended. Nontender. No guarding.   EXTREMITIES: calf soft, non tender     I&O's Detail    27 Nov 2017 07:01  -  28 Nov 2017 07:00  --------------------------------------------------------  IN:    dexmedetomidine Infusion: 25 mL    propofol Infusion: 97.1 mL    sodium chloride 0.9%.: 2400 mL  Total IN: 2522.1 mL    OUT:    Indwelling Catheter - Urethral: 1695 mL    Nasoenteral Tube: 150 mL  Total OUT: 1845 mL    Total NET: 677.1 mL      28 Nov 2017 07:01  -  29 Nov 2017 05:04  --------------------------------------------------------  IN:    Oral Fluid: 550 mL    sodium chloride 0.9%.: 1000 mL  Total IN: 1550 mL    OUT:    Indwelling Catheter - Urethral: 950 mL  Total OUT: 950 mL    Total NET: 600 mL      LABS:                        9.9    10.8  )-----------( 172      ( 28 Nov 2017 04:05 )             30.7     11-28    143  |  111<H>  |  64<H>  ----------------------------<  105<H>  4.5   |  25  |  1.97<H>    Ca    7.8<L>      28 Nov 2017 04:05  Phos  3.2     11-28  Mg     2.3     11-28      PT/INR - ( 28 Nov 2017 04:05 )   PT: 12.6 sec;   INR: 1.15 ratio      Assessment: 89 y/o male PMHx of a-fib s/p pacer, BPH, CHF, CVA, GERD, hip fx s/p hip replacement, HTN, seed implantation for prostate cancer admitted with ischemic bowel 2/2 incarcerated R inguinal hernia S/P Ex lap, BEATRIS, reduction of incarcerated R inguinal hernia POD#3, postop with elevated lactate (resolved) and ANDREW (improving). NGT pulled out last night, no N/V.  Now with SOB this morning, maintaining good O2 saturation on nasal cannula.     Plan:  - Duoneb treatment now, decreased IVF, Lasix 20mg IVP x1  - F/U CXR  - medicine consult  - NPO--await GI function  - coumadin resumed, trend INR  - local wound care, abdominal binder, dressing to be changed later today  - DVT prophylaxis, pain control  - Monitor I&Os, urine output  - follow up am labs  - continue current management per CCU  - will discuss with surgical attending

## 2017-11-29 NOTE — CONSULT NOTE ADULT - SUBJECTIVE AND OBJECTIVE BOX
HPI:  89 y/o male PMHx of a-fib s/p pacer, BPH, CHF, CVA, GERD, hip fx s/p hip replacement, HTN, seed implantation for prostate cancer, kidney cyst removal c/o coffee ground emesis, abdominal distention, and severe upper abdominal pain x 3 days. Patient is a poor historian so most of history was given by daughter in law Anneliese. States that the patient's aids noticed these symptoms for the past 3 days, worsening today. Coffee ground emesis witnessed by ED RN. Patient denies pain or nausea now. Last BM was yesterday and normal. Last ate at 7pm. Patient denies fever, chills, constipation, diarrhea, hematuria, melena, hematochezia, chest pain, shortness of breath, dizziness. Patient denies prior incident. (26 Nov 2017 01:47)  patient has  laprotomy for bowel ischemia and incarcerated rt inguinal hernia and was in ICU, intubated for 2 days. extubated and transferred to the floor  yesterday, patient had mild chest congestion which improved with Iv lasix. TTE ordered by cardiology.         PAST MEDICAL & SURGICAL HISTORY:  Hip fracture  Afib: on Coumadin  Peripheral neuropathy  CVA (cerebral infarction): no deficits  GERD (gastroesophageal reflux disease)  BPH (benign prostatic hypertrophy)  Prostate cancer  CHF (congestive heart failure)  HTN (hypertension)  H/O prostate cancer: s/p seed implantation  Status post-operative repair of hip fracture  Artificial cardiac pacemaker      REVIEW OF SYSTEMS:    CONSTITUTIONAL: No fever, weight loss, or fatigue  EYES: No eye pain, visual disturbances, or discharge  ENMT:  No difficulty hearing, tinnitus, vertigo; No sinus or throat pain  NECK: No pain or stiffness  BREASTS: No pain, masses, or nipple discharge  RESPIRATORY: No cough, wheezing, chills or hemoptysis; No shortness of breath  CARDIOVASCULAR: No chest pain, palpitations, dizziness, or leg swelling  GASTROINTESTINAL: No abdominal or epigastric pain. No nausea, vomiting, or hematemesis; No diarrhea or constipation. No melena or hematochezia.  GENITOURINARY: No dysuria, frequency, hematuria, or incontinence  NEUROLOGICAL: No headaches, memory loss, loss of strength, numbness, or tremors  SKIN: No itching, burning, rashes, or lesions   LYMPH NODES: No enlarged glands  ENDOCRINE: No heat or cold intolerance; No hair loss  MUSCULOSKELETAL: No joint pain or swelling; No muscle, back, or extremity pain  PSYCHIATRIC: No depression, anxiety, mood swings, or difficulty sleeping  HEME/LYMPH: No easy bruising, or bleeding gums  ALLERY AND IMMUNOLOGIC: No hives or eczema      MEDICATIONS  (STANDING):  brimonidine 0.2% Ophthalmic Solution 1 Drop(s) Both EYES two times a day  carvedilol 3.125 milliGRAM(s) Oral every 12 hours  gabapentin 600 milliGRAM(s) Oral daily  heparin  Injectable 5000 Unit(s) SubCutaneous every 12 hours  QUEtiapine 25 milliGRAM(s) Oral three times a day  sodium chloride 0.9%. 1000 milliLiter(s) (70 mL/Hr) IV Continuous <Continuous>    MEDICATIONS  (PRN):  benzocaine 15 mG/menthol 3.6 mG Lozenge 1 Lozenge Oral every 4 hours PRN Sore Throat  fentaNYL    Injectable 50 MICROGram(s) IV Push every 4 hours PRN Moderate Pain (4 - 6)      Allergies    No Known Allergies    Intolerances        SOCIAL HISTORY:    FAMILY HISTORY:  No pertinent family history in first degree relatives      Vital Signs Last 24 Hrs  T(C): 36.2 (29 Nov 2017 05:00), Max: 37.1 (28 Nov 2017 15:19)  T(F): 97.2 (29 Nov 2017 05:00), Max: 98.7 (28 Nov 2017 15:19)  HR: 60 (29 Nov 2017 05:00) (60 - 65)  BP: 141/73 (29 Nov 2017 05:00) (108/45 - 146/56)  BP(mean): 72 (28 Nov 2017 16:00) (72 - 72)  RR: 16 (29 Nov 2017 05:00) (16 - 26)  SpO2: 96% (29 Nov 2017 05:00) (94% - 100%)    PHYSICAL EXAM:    GENERAL: NAD, well-groomed, well-developed. obese.  HEAD:  Atraumatic, Normocephalic  EYES: EOMI, PERRLA, conjunctiva and sclera clear  ENMT: No tonsillar erythema, exudates, or enlargement; Moist mucous membranes, Good dentition, No lesions  NECK: Supple, No JVD, Normal thyroid  NERVOUS SYSTEM:  Alert & Oriented X3, Good concentration; Motor Strength 5/5 B/L upper and lower extremities; DTRs 2+ intact and symmetric  CHEST/LUNG: Clear to percussion bilaterally; No rales, rhonchi, wheezing, or rubs  HEART: Regular rate and rhythm; No murmurs, rubs, or gallops  ABDOMEN: Soft, Nontender, Nondistended; Bowel sounds present dressings over surical scar  EXTREMITIES:  2+ Peripheral Pulses, No clubbing, cyanosis, or edema  LYMPH: No lymphadenopathy noted.   SKIN: No rashes or lesions      LABS:                        9.2    10.2  )-----------( 196      ( 29 Nov 2017 06:34 )             29.0     11-29    145  |  113<H>  |  57<H>  ----------------------------<  92  4.2   |  21<L>  |  1.54<H>    Ca    7.8<L>      29 Nov 2017 06:34  Phos  2.5     11-29  Mg     2.4     11-29      PT/INR - ( 29 Nov 2017 06:34 )   PT: 12.1 sec;   INR: 1.11 ratio               CARDIAC MARKERS ( 29 Nov 2017 09:05 )  2.100 ng/mL / x     / x     / x     / x          RADIOLOGY & ADDITIONAL STUDIES: < from: Xray Chest 1 View AP/PA. (11.27.17 @ 08:50) >  EXAM:  CHEST SINGLE VIEW                            PROCEDURE DATE:  11/27/2017          INTERPRETATION:  Shortness of breath      comparison  to prior.film 11/26/2017    FINDINGS:      Heart is enlarged    Patchy airspace disease is noted along with septal thickening    Trace effusions are suggested. Vascular markings are prominent    Support devices in situ    Pacemaker noted.    IMPRESSION:    No significant change          < from: Xray Chest 1 View AP/PA. (11.29.17 @ 05:19) >      PROCEDURE DATE:  11/29/2017          INTERPRETATION:  CHEST AP PORTABLE:    History: sob.     Date and time of exam: 11/29/2017 5:08 AM.    Technique: A single AP view of the chest was obtained.    Comparison exam: 11/27/2017.    Findings:  Extubation and removal of nasogastric tube since the prior examination.   Persistent cardiomegaly and pulmonary vascular congestion. Small   bilateral pleural effusions. No change in pacemaker with a singlelead..    Impression:  Evidence of congestive heart failure. Extubation and removal of   nasogastric tube since the prior study..                CHRISTA QUINTERO M.D., ATTENDING RADIOLOGIST  This document has been electronically signed. Nov 29 2017  9:49AM              < end of copied text >

## 2017-11-29 NOTE — PROGRESS NOTE ADULT - SUBJECTIVE AND OBJECTIVE BOX
Patient seen in follow up for ANDREW; events noted; increasing SOB last evening.     MEDICATIONS  (STANDING):  aspirin  chewable 81 milliGRAM(s) Oral daily  brimonidine 0.2% Ophthalmic Solution 1 Drop(s) Both EYES two times a day  carvedilol 3.125 milliGRAM(s) Oral every 12 hours  gabapentin 600 milliGRAM(s) Oral daily  heparin  Injectable 5000 Unit(s) SubCutaneous every 12 hours  QUEtiapine 25 milliGRAM(s) Oral three times a day  warfarin 2 milliGRAM(s) Oral once    MEDICATIONS  (PRN):  benzocaine 15 mG/menthol 3.6 mG Lozenge 1 Lozenge Oral every 4 hours PRN Sore Throat  fentaNYL    Injectable 50 MICROGram(s) IV Push every 4 hours PRN Moderate Pain (4 - 6)    PHYSICAL EXAM:      T(C): 36.7 (11-29-17 @ 13:55), Max: 37.1 (11-28-17 @ 15:19)  HR: 59 (11-29-17 @ 13:55) (59 - 65)  BP: 137/68 (11-29-17 @ 13:55) (108/45 - 146/56)  RR: 16 (11-29-17 @ 11:34) (16 - 26)  SpO2: 99% (11-29-17 @ 13:55) (94% - 99%)  Wt(kg): --  Respiratory: clear anteriorly, decreased BS at bases  Cardiovascular: S1 S2  Gastrointestinal: soft moderate distention, minimal BS  Extremities:  1 edema                                    9.2    10.2  )-----------( 196      ( 29 Nov 2017 06:34 )             29.0     11-29    145  |  113<H>  |  57<H>  ----------------------------<  92  4.2   |  21<L>  |  1.54<H>    Ca    7.8<L>      29 Nov 2017 06:34  Phos  2.5     11-29  Mg     2.4     11-29            Assessment and Plan:    ANDREW suspected SIRS; ATN; post op for ischemic bowel.  Fluid overload; + troponin;  D/C IVF; diuretic rx;  Will follow.

## 2017-11-29 NOTE — CONSULT NOTE ADULT - SUBJECTIVE AND OBJECTIVE BOX
MEDICAL RECORD REVIEWED; HISTORY AND  REVIEW OF SYSTEMS OBTAINED; PATIENT EXAMINED:    89 YEAR OLD  MALE POD# 4 ISCHEMIC BOWEL & INCARCERATED HERNIA, EXTUBATED 2 DAYS AGO , ON IV FLUIDS DEVELOPING DYSPNEA AND WORSENING PULMONARY CONGESTION ON XRAY, RESPONDING TO IV LASIX 20 MG AND DECREASE ON SALINE TO 70 CC/HR. ALL LABS/RADIOLOGY/MEDICATIONS REVIEWED; : ELEVATED TROPONIN, RENAL INSUFFICIENCY; ECG: V PACED; CHEST XRAY: CARDIOMEGALY, VVI, CONGESTION    HISTORY: DEMENTIA, CHRONIC ATRIAL FIBRILLATION , PPM /VVI PROSTATE CA AND MULTIPLE OTHER COMORBIDITIES    EXAM: SAT 97% RESTING COMFORTABLY, NO JVD, POOR INSPIRATORY EFFORT, SOFT HEART SOUNDS, WOUND: DRESSED, HYPOACTIVE BOWEL SOUNDS, NO EDEMA, PERFUSED    IMPRESSION:    CONGESTIVE HEART FAILURE : PROBABLY AGGRAVATED BY FLUIDS AND RESPONDING TO CURRENT CARE; AGREE WITH PRESENT MANAGEMENT   RECENT ISCHEMIC BOWEL WITH MYOCARDIAL ISCHEMIA/INFARCTION; CHRONIC ATRIAL FIBRILLATION ; STATUS  POST PPM    CONTINUE DIURESIS  TITRATE FLUIDS  ECHO  FOLLOW UP  TROPONIN  ON BETA BLOCKER   MONITOR RENAL FUNCTION AND URINE OUTPUT  ON [facility]; RESUME ANTICOAGULATION ORAL WHEN FEASIBLE  INCENTIVE SPIROMETRY AS POSSIBLE    THANK YOU,      VICKIE REDD MD, FACC MEDICAL RECORD REVIEWED; HISTORY AND  REVIEW OF SYSTEMS OBTAINED; PATIENT EXAMINED:    89 YEAR OLD  MALE POD# 4 ISCHEMIC BOWEL & INCARCERATED HERNIA, EXTUBATED 2 DAYS AGO , ON IV FLUIDS DEVELOPING DYSPNEA AND WORSENING PULMONARY CONGESTION ON XRAY, RESPONDING TO IV LASIX 20 MG AND DECREASE ON SALINE TO 70 CC/HR. ALL LABS/RADIOLOGY/MEDICATIONS REVIEWED; : ELEVATED TROPONIN, RENAL INSUFFICIENCY; ECG: V PACED; CHEST XRAY: CARDIOMEGALY, VVI, CONGESTION    HISTORY: DEMENTIA, CHRONIC ATRIAL FIBRILLATION , PPM /VVI PROSTATE CA AND MULTIPLE OTHER COMORBIDITIES    EXAM: SAT 97% RESTING COMFORTABLY, NO JVD, POOR INSPIRATORY EFFORT, SOFT HEART SOUNDS, WOUND: DRESSED, HYPOACTIVE BOWEL SOUNDS, NO EDEMA, PERFUSED    IMPRESSION:    CONGESTIVE HEART FAILURE : PROBABLY AGGRAVATED BY FLUIDS AND RESPONDING TO CURRENT CARE; AGREE WITH PRESENT MANAGEMENT   RECENT ISCHEMIC BOWEL WITH MYOCARDIAL ISCHEMIA/INFARCTION; CHRONIC ATRIAL FIBRILLATION ; STATUS  POST PPM    CONTINUE DIURESIS  TITRATE FLUIDS  ECHO  FOLLOW UP  TROPONIN  ON BETA BLOCKER   MONITOR RENAL FUNCTION AND URINE OUTPUT  ON [facility]; RESUME ANTICOAGULATION ORAL WHEN FEASIBLE  INCENTIVE SPIROMETRY AS POSSIBLE    DNR    THANK YOU,      VICKIE REDD MD, FACC

## 2017-11-29 NOTE — CONSULT NOTE ADULT - CONSULT REASON
medical follow up of 89 year old male, s/p laparotomy for bowel ischemia and incarcerated rt inguinal hernia.  Patient was in ICU and extubated 2 days ago and transferred to the floor yesterday. He had  chest congestion yesterday, which improved after Iv lasix. he was seen by cardiology because of A. FIB, PPM, Hx of CHf.

## 2017-11-29 NOTE — CONSULT NOTE ADULT - ASSESSMENT
Chf in the setting of volume overload, s/p abdominal surgery for bowel ischemia ,fromIncarcerated Inguinal hernia rt. PPM, A. fib. ca prostrate s/p  needle implants., dementia, HTn, Old CVA with no residual weakness,

## 2017-11-30 LAB
ANION GAP SERPL CALC-SCNC: 8 MMOL/L — SIGNIFICANT CHANGE UP (ref 5–17)
BUN SERPL-MCNC: 54 MG/DL — HIGH (ref 7–23)
CALCIUM SERPL-MCNC: 7.9 MG/DL — LOW (ref 8.5–10.1)
CHLORIDE SERPL-SCNC: 114 MMOL/L — HIGH (ref 96–108)
CO2 SERPL-SCNC: 26 MMOL/L — SIGNIFICANT CHANGE UP (ref 22–31)
CREAT SERPL-MCNC: 1.43 MG/DL — HIGH (ref 0.5–1.3)
GLUCOSE SERPL-MCNC: 73 MG/DL — SIGNIFICANT CHANGE UP (ref 70–99)
HCT VFR BLD CALC: 29 % — LOW (ref 39–50)
HGB BLD-MCNC: 9.1 G/DL — LOW (ref 13–17)
INR BLD: 1.15 RATIO — SIGNIFICANT CHANGE UP (ref 0.88–1.16)
MAGNESIUM SERPL-MCNC: 2.6 MG/DL — SIGNIFICANT CHANGE UP (ref 1.6–2.6)
MCHC RBC-ENTMCNC: 31.5 GM/DL — LOW (ref 32–36)
MCHC RBC-ENTMCNC: 32.2 PG — SIGNIFICANT CHANGE UP (ref 27–34)
MCV RBC AUTO: 102.4 FL — HIGH (ref 80–100)
PHOSPHATE SERPL-MCNC: 2.5 MG/DL — SIGNIFICANT CHANGE UP (ref 2.5–4.5)
PLATELET # BLD AUTO: 198 K/UL — SIGNIFICANT CHANGE UP (ref 150–400)
POTASSIUM SERPL-MCNC: 3.9 MMOL/L — SIGNIFICANT CHANGE UP (ref 3.5–5.3)
POTASSIUM SERPL-SCNC: 3.9 MMOL/L — SIGNIFICANT CHANGE UP (ref 3.5–5.3)
PROTHROM AB SERPL-ACNC: 12.6 SEC — SIGNIFICANT CHANGE UP (ref 9.8–12.7)
RBC # BLD: 2.83 M/UL — LOW (ref 4.2–5.8)
RBC # FLD: 13.8 % — SIGNIFICANT CHANGE UP (ref 11–15)
SODIUM SERPL-SCNC: 148 MMOL/L — HIGH (ref 135–145)
WBC # BLD: 7.1 K/UL — SIGNIFICANT CHANGE UP (ref 3.8–10.5)
WBC # FLD AUTO: 7.1 K/UL — SIGNIFICANT CHANGE UP (ref 3.8–10.5)

## 2017-11-30 RX ORDER — ACETAMINOPHEN 500 MG
650 TABLET ORAL EVERY 6 HOURS
Qty: 0 | Refills: 0 | Status: DISCONTINUED | OUTPATIENT
Start: 2017-11-30 | End: 2017-12-12

## 2017-11-30 RX ADMIN — HEPARIN SODIUM 5000 UNIT(S): 5000 INJECTION INTRAVENOUS; SUBCUTANEOUS at 17:23

## 2017-11-30 RX ADMIN — BRIMONIDINE TARTRATE 1 DROP(S): 2 SOLUTION/ DROPS OPHTHALMIC at 17:23

## 2017-11-30 RX ADMIN — Medication 650 MILLIGRAM(S): at 23:09

## 2017-11-30 RX ADMIN — QUETIAPINE FUMARATE 25 MILLIGRAM(S): 200 TABLET, FILM COATED ORAL at 14:07

## 2017-11-30 RX ADMIN — CARVEDILOL PHOSPHATE 3.12 MILLIGRAM(S): 80 CAPSULE, EXTENDED RELEASE ORAL at 05:51

## 2017-11-30 RX ADMIN — QUETIAPINE FUMARATE 25 MILLIGRAM(S): 200 TABLET, FILM COATED ORAL at 21:33

## 2017-11-30 RX ADMIN — HEPARIN SODIUM 5000 UNIT(S): 5000 INJECTION INTRAVENOUS; SUBCUTANEOUS at 05:51

## 2017-11-30 RX ADMIN — QUETIAPINE FUMARATE 25 MILLIGRAM(S): 200 TABLET, FILM COATED ORAL at 05:51

## 2017-11-30 RX ADMIN — GABAPENTIN 300 MILLIGRAM(S): 400 CAPSULE ORAL at 11:03

## 2017-11-30 RX ADMIN — BRIMONIDINE TARTRATE 1 DROP(S): 2 SOLUTION/ DROPS OPHTHALMIC at 05:51

## 2017-11-30 RX ADMIN — Medication 81 MILLIGRAM(S): at 11:11

## 2017-11-30 RX ADMIN — CARVEDILOL PHOSPHATE 3.12 MILLIGRAM(S): 80 CAPSULE, EXTENDED RELEASE ORAL at 17:23

## 2017-11-30 NOTE — PROGRESS NOTE ADULT - SUBJECTIVE AND OBJECTIVE BOX
Patient is a 89y old  Male who presents with a chief complaint of Coffee ground emesis, abdominal distention, and upper abdominal pain x 3 days (26 Nov 2017 01:47)  s/p abdominal surgery. mild fluid overload, Elevated troponin    INTERVAL HPI/OVERNIGHT EVENTS: as above.  INR 1.15     MEDICATIONS  (STANDING):  aspirin  chewable 81 milliGRAM(s) Oral daily  brimonidine 0.2% Ophthalmic Solution 1 Drop(s) Both EYES two times a day  carvedilol 3.125 milliGRAM(s) Oral every 12 hours  gabapentin 300 milliGRAM(s) Oral daily  heparin  Injectable 5000 Unit(s) SubCutaneous every 12 hours  QUEtiapine 25 milliGRAM(s) Oral three times a day    MEDICATIONS  (PRN):  benzocaine 15 mG/menthol 3.6 mG Lozenge 1 Lozenge Oral every 4 hours PRN Sore Throat  fentaNYL    Injectable 50 MICROGram(s) IV Push every 4 hours PRN Moderate Pain (4 - 6)      Allergies    No Known Allergies    Intolerances        REVIEW OF SYSTEMS:  CONSTITUTIONAL: No fever, weight loss, or fatigue  EYES: No eye pain, visual disturbances, or discharge  ENMT:  No difficulty hearing, tinnitus, vertigo; No sinus or throat pain  NECK: No pain or stiffness  BREASTS: No pain, masses, or nipple discharge  RESPIRATORY: No cough, wheezing, chills or hemoptysis; No shortness of breath  CARDIOVASCULAR: No chest pain, palpitations, dizziness, or leg swelling  GASTROINTESTINAL: No abdominal or epigastric pain. No nausea, vomiting, or hematemesis; No diarrhea or constipation. No melena or hematochezia.  GENITOURINARY: No dysuria, frequency, hematuria, or incontinence  NEUROLOGICAL: No headaches, memory loss, loss of strength, numbness, or tremors  SKIN: No itching, burning, rashes, or lesions   LYMPH NODES: No enlarged glands  ENDOCRINE: No heat or cold intolerance; No hair loss  MUSCULOSKELETAL: No joint pain or swelling; No muscle, back, or extremity pain  PSYCHIATRIC: No depression, anxiety, mood swings, or difficulty sleeping  HEME/LYMPH: No easy bruising, or bleeding gums  ALLERY AND IMMUNOLOGIC: No hives or eczema    Vital Signs Last 24 Hrs  T(C): 37 (30 Nov 2017 05:35), Max: 37.1 (29 Nov 2017 23:49)  T(F): 98.6 (30 Nov 2017 05:35), Max: 98.8 (29 Nov 2017 23:49)  HR: 60 (30 Nov 2017 05:35) (59 - 60)  BP: 127/65 (30 Nov 2017 05:35) (110/57 - 137/68)  BP(mean): --  RR: 18 (30 Nov 2017 05:35) (16 - 18)  SpO2: 100% (30 Nov 2017 05:35) (95% - 100%)    PHYSICAL EXAM:  GENERAL: NAD, well-groomed, well-developed  HEAD:  Atraumatic, Normocephalic  EYES: EOMI, PERRLA, conjunctiva and sclera clear  ENMT: No tonsillar erythema, exudates, or enlargement; Moist mucous membranes, Good dentition, No lesions  NECK: Supple, No JVD, Normal thyroid  NERVOUS SYSTEM:  Alert & Oriented X3, Good concentration; Motor Strength 5/5 B/L upper and lower extremities; DTRs 2+ intact and symmetric  CHEST/LUNG: Clear to percussion bilaterally; No rales, rhonchi, wheezing, or rubs  HEART: Regular rate and rhythm; No murmurs, rubs, or gallops  ABDOMEN: Soft, Nontender, Nondistended; Bowel sounds present  EXTREMITIES:  2+ Peripheral Pulses, No clubbing, cyanosis, or edema  LYMPH: No lymphadenopathy noted  SKIN: No rashes or lesions    LABS:                        9.1    7.1   )-----------( 198      ( 30 Nov 2017 06:15 )             29.0     11-30    148<H>  |  114<H>  |  54<H>  ----------------------------<  73  3.9   |  26  |  1.43<H>    Ca    7.9<L>      30 Nov 2017 06:17  Phos  2.5     11-30  Mg     2.6     11-30        PT/INR - ( 30 Nov 2017 06:17 )   PT: 12.6 sec;   INR: 1.15 ratio             CAPILLARY BLOOD GLUCOSE          CARDIAC MARKERS ( 29 Nov 2017 09:05 )  2.100 ng/mL / x     / x     / x     / x                RADIOLOGY & ADDITIONAL TESTS:    Imaging Personally Reviewed:  [ ] YES  [ ] NO    Consultant(s) Notes Reviewed:  [ ] YES  [ ] NO    Care Discussed with Consultants/Other Providers [ ] YES  [ ] NO    Care discussed with family,         [  ]   yes  [  ]  No    imp:    stable[ ]    unstable[  ]     improving [ x  ]       unchanged  [  ]                Plans:  Continue present plans  [ x ] coumadin 3 mg today. TTE pending. DNR status, cardiology follow up               New consult [  ]   specialty  .......               order test[  ]    test name.  inr in am                Discharge Planning  [  ]

## 2017-11-30 NOTE — PROGRESS NOTE ADULT - SUBJECTIVE AND OBJECTIVE BOX
BREATHING COMFORTABLY  TELEMETRY: ATRIAL FIBRILLATION /PACED  TROP 2.1  STABLE VITALS  VOIDING  NO JVD  DISTANT HEART SOUNDS  COARSE BREATH SOUNDS  WOUNDS DRESSED/BOUND  NO EDEMA, PERFUSED    STABLE RESPIRATORY STATUS  PRN DIURESIS  IV FLUIDS STOPPED  ON ASPIRIN  MONITORING RENAL FUNCTION   FOR ECHO  SUPPORTIVE CARE

## 2017-11-30 NOTE — PROGRESS NOTE ADULT - SUBJECTIVE AND OBJECTIVE BOX
SURGERY PROGRESS HPI:  Pt seen and examined at bedside. Pain is well controlled on pain medication, improving.  Pt denies nausea and vomiting. No BM/flatus. Voiding well. Pt denies chest pain, SOB, dizziness, fever, chills.       Vital Signs Last 24 Hrs  T(C): 37 (30 Nov 2017 05:35), Max: 37.1 (29 Nov 2017 23:49)  T(F): 98.6 (30 Nov 2017 05:35), Max: 98.8 (29 Nov 2017 23:49)  HR: 60 (30 Nov 2017 05:35) (59 - 60)  BP: 127/65 (30 Nov 2017 05:35) (110/57 - 137/68)  BP(mean): --  RR: 18 (30 Nov 2017 05:35) (16 - 18)  SpO2: 100% (30 Nov 2017 05:35) (95% - 100%)      PHYSICAL EXAM:    GENERAL: NAD  HEAD:  Atraumatic, Normocephalic  CHEST/LUNG: Clear to ausculation, bilaterally   HEART: RRR S1S2  ABDOMEN: Obese, dressing dry/intact with small serosanguinous stain. Removed, cleaned with NS and new dressing placed. Patient tolerated well. abdominal binder intact. softly distended, -BS, soft, non tender, no guarding  EXTREMITIES:  calf soft, non tender b/l    I&O's Detail    28 Nov 2017 07:01  -  29 Nov 2017 07:00  --------------------------------------------------------  IN:    Oral Fluid: 550 mL    sodium chloride 0.9%: 1800 mL  Total IN: 2350 mL    OUT:    Indwelling Catheter - Urethral: 950 mL  Total OUT: 950 mL    Total NET: 1400 mL      29 Nov 2017 07:01  -  30 Nov 2017 06:57  --------------------------------------------------------  IN:  Total IN: 0 mL    OUT:    Incontinent per Diaper: 1 mL  Total OUT: 1 mL    Total NET: -1 mL          LABS:                        9.1    7.1   )-----------( 198      ( 30 Nov 2017 06:15 )             29.0     11-30    148<H>  |  114<H>  |  54<H>  ----------------------------<  73  3.9   |  26  |  1.43<H>    Ca    7.9<L>      30 Nov 2017 06:17  Phos  2.5     11-29  Mg     2.4     11-29      PT/INR - ( 30 Nov 2017 06:17 )   PT: 12.6 sec;   INR: 1.15 ratio         Assessment: 89 y/o male PMHx of a-fib s/p pacer, BPH, CHF, CVA, GERD, hip fx s/p hip replacement, HTN, seed implantation for prostate cancer admitted with ischemic bowel 2/2 incarcerated R inguinal hernia S/P Ex lap, BEATRIS, reduction of incarcerated R inguinal hernia POD#3, postop with elevated lactate (resolved) and ANDREW (improving). NGT out.      Plan:  - NPO, await GI function  - coumadin resumed, trend INR  - local wound care, abdominal binder  - DVT prophylaxis, pain control  - follow up am labs  - continue current medical care and cardiology care  - will discuss with surgical attending

## 2017-12-01 LAB
ANION GAP SERPL CALC-SCNC: 10 MMOL/L — SIGNIFICANT CHANGE UP (ref 5–17)
BUN SERPL-MCNC: 48 MG/DL — HIGH (ref 7–23)
CALCIUM SERPL-MCNC: 8.4 MG/DL — LOW (ref 8.5–10.1)
CHLORIDE SERPL-SCNC: 112 MMOL/L — HIGH (ref 96–108)
CO2 SERPL-SCNC: 25 MMOL/L — SIGNIFICANT CHANGE UP (ref 22–31)
CREAT SERPL-MCNC: 1.24 MG/DL — SIGNIFICANT CHANGE UP (ref 0.5–1.3)
GLUCOSE SERPL-MCNC: 65 MG/DL — LOW (ref 70–99)
HCT VFR BLD CALC: 29.7 % — LOW (ref 39–50)
HGB BLD-MCNC: 9.4 G/DL — LOW (ref 13–17)
INR BLD: 1.29 RATIO — HIGH (ref 0.88–1.16)
MAGNESIUM SERPL-MCNC: 2.7 MG/DL — HIGH (ref 1.6–2.6)
MCHC RBC-ENTMCNC: 31.6 GM/DL — LOW (ref 32–36)
MCHC RBC-ENTMCNC: 32 PG — SIGNIFICANT CHANGE UP (ref 27–34)
MCV RBC AUTO: 101.4 FL — HIGH (ref 80–100)
PHOSPHATE SERPL-MCNC: 2.4 MG/DL — LOW (ref 2.5–4.5)
PLATELET # BLD AUTO: 238 K/UL — SIGNIFICANT CHANGE UP (ref 150–400)
POTASSIUM SERPL-MCNC: 4 MMOL/L — SIGNIFICANT CHANGE UP (ref 3.5–5.3)
POTASSIUM SERPL-SCNC: 4 MMOL/L — SIGNIFICANT CHANGE UP (ref 3.5–5.3)
PROTHROM AB SERPL-ACNC: 14.1 SEC — HIGH (ref 9.8–12.7)
RBC # BLD: 2.93 M/UL — LOW (ref 4.2–5.8)
RBC # FLD: 14.1 % — SIGNIFICANT CHANGE UP (ref 11–15)
SODIUM SERPL-SCNC: 147 MMOL/L — HIGH (ref 135–145)
WBC # BLD: 7 K/UL — SIGNIFICANT CHANGE UP (ref 3.8–10.5)
WBC # FLD AUTO: 7 K/UL — SIGNIFICANT CHANGE UP (ref 3.8–10.5)

## 2017-12-01 RX ORDER — WARFARIN SODIUM 2.5 MG/1
3 TABLET ORAL ONCE
Qty: 0 | Refills: 0 | Status: COMPLETED | OUTPATIENT
Start: 2017-12-01 | End: 2017-12-01

## 2017-12-01 RX ADMIN — BRIMONIDINE TARTRATE 1 DROP(S): 2 SOLUTION/ DROPS OPHTHALMIC at 17:34

## 2017-12-01 RX ADMIN — Medication 81 MILLIGRAM(S): at 11:29

## 2017-12-01 RX ADMIN — HEPARIN SODIUM 5000 UNIT(S): 5000 INJECTION INTRAVENOUS; SUBCUTANEOUS at 06:05

## 2017-12-01 RX ADMIN — Medication 650 MILLIGRAM(S): at 00:10

## 2017-12-01 RX ADMIN — WARFARIN SODIUM 3 MILLIGRAM(S): 2.5 TABLET ORAL at 23:55

## 2017-12-01 RX ADMIN — QUETIAPINE FUMARATE 25 MILLIGRAM(S): 200 TABLET, FILM COATED ORAL at 23:33

## 2017-12-01 RX ADMIN — BRIMONIDINE TARTRATE 1 DROP(S): 2 SOLUTION/ DROPS OPHTHALMIC at 06:05

## 2017-12-01 RX ADMIN — QUETIAPINE FUMARATE 25 MILLIGRAM(S): 200 TABLET, FILM COATED ORAL at 06:05

## 2017-12-01 RX ADMIN — GABAPENTIN 300 MILLIGRAM(S): 400 CAPSULE ORAL at 11:29

## 2017-12-01 RX ADMIN — CARVEDILOL PHOSPHATE 3.12 MILLIGRAM(S): 80 CAPSULE, EXTENDED RELEASE ORAL at 06:05

## 2017-12-01 RX ADMIN — HEPARIN SODIUM 5000 UNIT(S): 5000 INJECTION INTRAVENOUS; SUBCUTANEOUS at 17:34

## 2017-12-01 RX ADMIN — QUETIAPINE FUMARATE 25 MILLIGRAM(S): 200 TABLET, FILM COATED ORAL at 11:29

## 2017-12-01 NOTE — PROGRESS NOTE ADULT - SUBJECTIVE AND OBJECTIVE BOX
TELEMETRY: PACED  STABLE VITALS  ECHO: NORMAL LV EF; DIASTOLIC DYSFUNCTION  CLINICALLY STABLE  SUPPORTIVE CARE  CONTIINUING PRESENT CV CARE, WITH PRN DIURESIS  RESOLVED : ACUTE DIASTOLIC HEART FAILURE  PAROXYSMAL ATRIAL FIBRILLATION  ISCHEMIC COLITIS  STATUS  POST SURGERY WITH SURGICAL FOLLOW FOLLOW UP  RENAL INSUFFICIENCY  DNR  DC TELEMETRY    VICKIE REDD MD, FACC

## 2017-12-01 NOTE — PROGRESS NOTE ADULT - SUBJECTIVE AND OBJECTIVE BOX
SURGERY PROGRESS HPI:  Pt seen and examined at bedside. Pain is well controlled on pain medication, improving.  Pt denies nausea and vomiting. No BM/flatus. Voiding well. Pt denies chest pain, SOB, dizziness, fever, chills.       Vital Signs Last 24 Hrs  T(C): 36.7 (01 Dec 2017 05:44), Max: 36.8 (30 Nov 2017 23:52)  T(F): 98 (01 Dec 2017 05:44), Max: 98.2 (30 Nov 2017 23:52)  HR: 60 (01 Dec 2017 05:44) (60 - 60)  BP: 128/57 (01 Dec 2017 05:44) (128/57 - 150/72)  BP(mean): --  RR: 18 (01 Dec 2017 05:44) (18 - 18)  SpO2: 94% (01 Dec 2017 05:44) (94% - 100%)      GENERAL: NAD  HEAD:  Atraumatic, Normocephalic  CHEST/LUNG: Clear to ausculation, bilaterally   HEART: RRR S1S2  ABDOMEN: Obese, dressing clean/dry/intact. Removed, cleaned with NS and new dressing placed. Patient tolerated well. abdominal binder intact. softly distended, hypoactive BS, soft, non tender, no guarding  EXTREMITIES:  calf soft, non tender b/l      I&O's Detail    29 Nov 2017 07:01  -  30 Nov 2017 07:00  --------------------------------------------------------  IN:  Total IN: 0 mL    OUT:    Incontinent per Diaper: 1 mL  Total OUT: 1 mL    Total NET: -1 mL      30 Nov 2017 07:01  -  01 Dec 2017 06:49  --------------------------------------------------------  IN:  Total IN: 0 mL    OUT:  Total OUT: 0 mL    Total NET: 0 mL          LABS:                        9.4    7.0   )-----------( 238      ( 01 Dec 2017 06:19 )             29.7     11-30    148<H>  |  114<H>  |  54<H>  ----------------------------<  73  3.9   |  26  |  1.43<H>    Ca    7.9<L>      30 Nov 2017 06:17  Phos  2.5     11-30  Mg     2.6     11-30      PT/INR - ( 01 Dec 2017 06:19 )   PT: 14.1 sec;   INR: 1.29 ratio           Assessment: 89 y/o male PMHx of a-fib s/p pacer, BPH, CHF, CVA, GERD, hip fx s/p hip replacement, HTN, seed implantation for prostate cancer admitted with ischemic bowel 2/2 incarcerated R inguinal hernia S/P Ex lap, BEATRIS, reduction of incarcerated R inguinal hernia POD#3, postop with elevated lactate (resolved) and ANDREW (improving). NGT out.      Plan:  - NPO with sips and ice chips, await GI function  - coumadin resumed, trend INR  - local wound care, abdominal binder  - DVT prophylaxis, pain control  - follow up am labs  - continue current medical care and cardiology care  - will discuss with surgical attending

## 2017-12-01 NOTE — PROGRESS NOTE ADULT - SUBJECTIVE AND OBJECTIVE BOX
Postoperative Day #: 5 s/p exploratory laparotomy, BEATRIS, reduction of R inguinal hernia    Patient seen comfortably resting in chair.  Complains about being thirsty. No flatus/BM. Abdominal pain is well controlled. Denies nausea and vomiting. Tolerating diet. Denies chest pain, dyspnea, cough.    T(F): 97 (12-01-17 @ 11:25), Max: 98.2 (11-30-17 @ 23:52)  HR: 60 (12-01-17 @ 11:25) (60 - 60)  BP: 143/73 (12-01-17 @ 11:25) (128/57 - 143/73)  RR: 20 (12-01-17 @ 11:25) (18 - 20)  SpO2: 99% (12-01-17 @ 11:25) (94% - 100%)  Wt(kg): --  CAPILLARY BLOOD GLUCOSE      PHYSICAL EXAM:  General: NAD, WDWN  Neuro:  Alert & oriented x 3  HEENT: NCAT, EOMI, conjunctiva clear  CV: +S1+S2 regular rate and rhythm  Lung: clear to ausculation bilaterally, respirations nonlabored, good inspiratory effort  Abdomen: Abdominal binder clean dry and intact. normoactive BS   Extremities: 1+ dependent edema, no calf tenderness noted     LABS:                        9.4    7.0   )-----------( 238      ( 01 Dec 2017 06:19 )             29.7     12-01    147<H>  |  112<H>  |  48<H>  ----------------------------<  65<L>  4.0   |  25  |  1.24    Ca    8.4<L>      01 Dec 2017 06:19  Phos  2.4     12-01  Mg     2.7     12-01      PT/INR - ( 01 Dec 2017 06:19 )   PT: 14.1 sec;   INR: 1.29 ratio           I&O's Detail    30 Nov 2017 07:01  -  01 Dec 2017 07:00  --------------------------------------------------------  IN:  Total IN: 0 mL    OUT:  Total OUT: 0 mL    Total NET: 0 mL      Impression: 89y Male with PMH Hip fracture, Afib, Peripheral neuropathy, CVA, GERD, BPH ,Prostate cancer, CHF, HTN, POD #5 s/p ex lap, BEATRIS, R inguinal hernia reduction. Abdominal binder intact, pain adequately controlled. + BS. Improving.       Plan:  - advance diet?  -continue coumadin, Monitor INR  -Increase activity with PT, OOB, Ambulate  -educated on proper incentive spirometry use  -continue local wound care  -f/u AM labs  -will discuss with surgical attending Postoperative Day #:5 s/p exploratory laparotomy, BEATRIS, reduction of R inguinal hernia    Patient seen comfortably resting in chair.  Patient reports being thirsty. No flatus/BM. Abdominal pain is well controlled. Denies nausea and vomiting. Tolerating diet. Denies chest pain, dyspnea, cough.    T(F): 97 (12-01-17 @ 11:25), Max: 98.2 (11-30-17 @ 23:52)  HR: 60 (12-01-17 @ 11:25) (60 - 60)  BP: 143/73 (12-01-17 @ 11:25) (128/57 - 143/73)  RR: 20 (12-01-17 @ 11:25) (18 - 20)  SpO2: 99% (12-01-17 @ 11:25) (94% - 100%)  Wt(kg): --  CAPILLARY BLOOD GLUCOSE      PHYSICAL EXAM:  General: NAD, WDWN  Neuro:  Alert & oriented x 3  HEENT: NCAT, EOMI, conjunctiva clear  CV: +S1+S2 regular rate and rhythm  Lung: clear to ausculation bilaterally, respirations nonlabored, good inspiratory effort  Abdomen: Abdominal binder clean dry and intact. Slightly distended, non tender. hypoactive BS  Extremities: 1+ dependent edema, no calf tenderness noted     LABS:                        9.4    7.0   )-----------( 238      ( 01 Dec 2017 06:19 )             29.7     12-01    147<H>  |  112<H>  |  48<H>  ----------------------------<  65<L>  4.0   |  25  |  1.24    Ca    8.4<L>      01 Dec 2017 06:19  Phos  2.4     12-01  Mg     2.7     12-01      PT/INR - ( 01 Dec 2017 06:19 )   PT: 14.1 sec;   INR: 1.29 ratio           I&O's Detail    30 Nov 2017 07:01  -  01 Dec 2017 07:00  --------------------------------------------------------  IN:  Total IN: 0 mL    OUT:  Total OUT: 0 mL    Total NET: 0 mL      Impression: 89y Male with PMH Hip fracture, Afib, Peripheral neuropathy, CVA, GERD, BPH, Prostate cancer, CHF, HTN, POD #5 s/p ex lap, BEATRIS, R inguinal hernia reduction. Abdominal binder intact, pain adequately controlled. Stable.       Plan:  -continue coumadin, Monitor INR  -Increase activity with PT, OOB, Ambulate  -educated on proper incentive spirometry use  -continue local wound care  -f/u AM labs  -will discuss with surgical attending Postoperative Day #5 s/p exploratory laparotomy, EBATRIS, reduction of R inguinal hernia    Patient seen comfortably resting in bed, examined at bedside. No flatus/BM. Abdominal pain is well controlled. Denies nausea and vomiting. Tolerating diet. Denies chest pain, dyspnea, cough. No current complaints, no acute events overnight.     T(F): 97 (12-01-17 @ 11:25), Max: 98.2 (11-30-17 @ 23:52)  HR: 60 (12-01-17 @ 11:25) (60 - 60)  BP: 143/73 (12-01-17 @ 11:25) (128/57 - 143/73)  RR: 20 (12-01-17 @ 11:25) (18 - 20)  SpO2: 99% (12-01-17 @ 11:25) (94% - 100%)  Wt(kg): --  CAPILLARY BLOOD GLUCOSE      PHYSICAL EXAM:  General: NAD, WDWN  Neuro:  Alert & oriented x 3  HEENT: NCAT, EOMI, conjunctiva clear  CV: +S1+S2 regular rate and rhythm  Lung: clear to ausculation bilaterally, respirations nonlabored, good inspiratory effort  Abdomen: Slightly distended, abdominal binder and incisional site dressings clean, dry, and intact. hypoactive BS, nontender.  Extremities: 1+ dependent edema, no calf tenderness noted     LABS:                        9.4    7.0   )-----------( 238      ( 01 Dec 2017 06:19 )             29.7     12-01    147<H>  |  112<H>  |  48<H>  ----------------------------<  65<L>  4.0   |  25  |  1.24    Ca    8.4<L>      01 Dec 2017 06:19  Phos  2.4     12-01  Mg     2.7     12-01      PT/INR - ( 01 Dec 2017 06:19 )   PT: 14.1 sec;   INR: 1.29 ratio           I&O's Detail    30 Nov 2017 07:01  -  01 Dec 2017 07:00  --------------------------------------------------------  IN:  Total IN: 0 mL    OUT:  Total OUT: 0 mL    Total NET: 0 mL      Impression: 89y Male with PMH Hip fracture, Afib, Peripheral neuropathy, CVA, GERD, BPH, Prostate cancer, CHF, HTN, POD #5 s/p ex lap, BEATRIS, R inguinal hernia reduction. Pain adequately controlled. Stable.       Plan:  - continue NPO status  -continue coumadin, Monitor INR  -Increase activity with PT, OOB, Ambulate  -educated on proper incentive spirometry use  -continue local wound care  -f/u AM labs  -will discuss with surgical attending Postoperative Day #5 s/p exploratory laparotomy, BEATRIS, reduction of R inguinal hernia    Patient seen comfortably resting in bed, examined at bedside. No flatus/BM. Abdominal pain is well controlled. Denies nausea and vomiting. Tolerating diet. Denies chest pain, dyspnea, cough. No current complaints, no acute events overnight.     T(F): 97 (12-01-17 @ 11:25), Max: 98.2 (11-30-17 @ 23:52)  HR: 60 (12-01-17 @ 11:25) (60 - 60)  BP: 143/73 (12-01-17 @ 11:25) (128/57 - 143/73)  RR: 20 (12-01-17 @ 11:25) (18 - 20)  SpO2: 99% (12-01-17 @ 11:25) (94% - 100%)  Wt(kg): --  CAPILLARY BLOOD GLUCOSE      PHYSICAL EXAM:  General: NAD, WDWN  Neuro:  Alert & oriented x 3  HEENT: NCAT, EOMI, conjunctiva clear  CV: +S1+S2 regular rate and rhythm  Lung: clear to ausculation bilaterally, respirations nonlabored, good inspiratory effort  Abdomen: Obese, distended, abdominal binder and dressings clean, dry, and intact. hypoactive BS, nontender, no guarding.   Extremities: 1+ dependent edema, no calf tenderness noted     LABS:                        9.4    7.0   )-----------( 238      ( 01 Dec 2017 06:19 )             29.7     12-01    147<H>  |  112<H>  |  48<H>  ----------------------------<  65<L>  4.0   |  25  |  1.24    Ca    8.4<L>      01 Dec 2017 06:19  Phos  2.4     12-01  Mg     2.7     12-01      PT/INR - ( 01 Dec 2017 06:19 )   PT: 14.1 sec;   INR: 1.29 ratio           I&O's Detail    30 Nov 2017 07:01  -  01 Dec 2017 07:00  --------------------------------------------------------  IN:  Total IN: 0 mL    OUT:  Total OUT: 0 mL    Total NET: 0 mL      Impression: 89y Male with PMH Hip fracture, Afib, Peripheral neuropathy, CVA, GERD, BPH, Prostate cancer, CHF, HTN, POD #5 s/p ex lap, BEATRIS, R inguinal hernia reduction. Pain adequately controlled. Stable.       Plan:  - continue NPO with sips/chips, may need parental nutrition if no return of bowel function  -continue coumadin, Monitor INR  -Increase activity with PT, OOB, Ambulate  -educated on proper incentive spirometry use  -continue local wound care  -f/u AM labs, AXR in am  -will discuss with surgical attending

## 2017-12-01 NOTE — PROGRESS NOTE ADULT - SUBJECTIVE AND OBJECTIVE BOX
Patient seen in follow up for ANDREW; wants to eat; no distress,     MEDICATIONS  (STANDING):  aspirin  chewable 81 milliGRAM(s) Oral daily  brimonidine 0.2% Ophthalmic Solution 1 Drop(s) Both EYES two times a day  carvedilol 3.125 milliGRAM(s) Oral every 12 hours  gabapentin 300 milliGRAM(s) Oral daily  heparin  Injectable 5000 Unit(s) SubCutaneous every 12 hours  QUEtiapine 25 milliGRAM(s) Oral three times a day  warfarin 3 milliGRAM(s) Oral once    MEDICATIONS  (PRN):  acetaminophen   Tablet. 650 milliGRAM(s) Oral every 6 hours PRN Mild Pain (1 - 3)  benzocaine 15 mG/menthol 3.6 mG Lozenge 1 Lozenge Oral every 4 hours PRN Sore Throat  fentaNYL    Injectable 50 MICROGram(s) IV Push every 4 hours PRN Moderate Pain (4 - 6)    PHYSICAL EXAM:      T(C): 36.7 (12-01-17 @ 14:15), Max: 36.8 (11-30-17 @ 23:52)  HR: 89 (12-01-17 @ 14:15) (60 - 89)  BP: 128/72 (12-01-17 @ 14:15) (128/57 - 143/73)  RR: 18 (12-01-17 @ 14:15) (18 - 20)  SpO2: 100% (12-01-17 @ 14:15) (94% - 100%)  Wt(kg): --  Respiratory: clear anteriorly, decreased BS at bases  Cardiovascular: S1 S2  Gastrointestinal: soft NT moderate D no BS noted  Extremities:  1 edema                                    9.4    7.0   )-----------( 238      ( 01 Dec 2017 06:19 )             29.7     12-01    147<H>  |  112<H>  |  48<H>  ----------------------------<  65<L>  4.0   |  25  |  1.24    Ca    8.4<L>      01 Dec 2017 06:19  Phos  2.4     12-01  Mg     2.7     12-01            Assessment and Plan:    ANDREW suspected SIRS; ATN; post op for ischemic bowel.  Warranted diuresis;   Will follow.

## 2017-12-01 NOTE — PROGRESS NOTE ADULT - SUBJECTIVE AND OBJECTIVE BOX
Patient is a 89y old  Male who presents with a chief complaint of Coffee ground emesis, abdominal distention, and upper abdominal pain x 3 days (26 Nov 2017 01:47)    s/p laparotomy for ischemic bowel and incarcerated rt inguinal hernia.  INTERVAL HPI/OVERNIGHT EVENTS: NG Tube removed by surgery. no dyspnea. No abd pain . No N/v.    MEDICATIONS  (STANDING):  aspirin  chewable 81 milliGRAM(s) Oral daily  brimonidine 0.2% Ophthalmic Solution 1 Drop(s) Both EYES two times a day  carvedilol 3.125 milliGRAM(s) Oral every 12 hours  gabapentin 300 milliGRAM(s) Oral daily  heparin  Injectable 5000 Unit(s) SubCutaneous every 12 hours  QUEtiapine 25 milliGRAM(s) Oral three times a day  warfarin 3 milliGRAM(s) Oral once    MEDICATIONS  (PRN):  acetaminophen   Tablet. 650 milliGRAM(s) Oral every 6 hours PRN Mild Pain (1 - 3)  benzocaine 15 mG/menthol 3.6 mG Lozenge 1 Lozenge Oral every 4 hours PRN Sore Throat  fentaNYL    Injectable 50 MICROGram(s) IV Push every 4 hours PRN Moderate Pain (4 - 6)      Allergies    No Known Allergies    Intolerances        REVIEW OF SYSTEMS:  CONSTITUTIONAL: No fever, weight loss, or fatigue  EYES: No eye pain, visual disturbances, or discharge  ENMT:  No difficulty hearing, tinnitus, vertigo; No sinus or throat pain  NECK: No pain or stiffness  BREASTS: No pain, masses, or nipple discharge  RESPIRATORY: No cough, wheezing, chills or hemoptysis; No shortness of breath  CARDIOVASCULAR: No chest pain, palpitations, dizziness, or leg swelling  GASTROINTESTINAL: No abdominal or epigastric pain. No nausea, vomiting, or hematemesis; No diarrhea or constipation. No melena or hematochezia.  GENITOURINARY: No dysuria, frequency, hematuria, or incontinence  NEUROLOGICAL: No headaches, memory loss, loss of strength, numbness, or tremors  SKIN: No itching, burning, rashes, or lesions   LYMPH NODES: No enlarged glands  ENDOCRINE: No heat or cold intolerance; No hair loss  MUSCULOSKELETAL: No joint pain or swelling; No muscle, back, or extremity pain  PSYCHIATRIC: No depression, anxiety, mood swings, or difficulty sleeping  HEME/LYMPH: No easy bruising, or bleeding gums  ALLERY AND IMMUNOLOGIC: No hives or eczema    Vital Signs Last 24 Hrs  T(C): 36.7 (01 Dec 2017 05:44), Max: 36.8 (30 Nov 2017 23:52)  T(F): 98 (01 Dec 2017 05:44), Max: 98.2 (30 Nov 2017 23:52)  HR: 60 (01 Dec 2017 05:44) (60 - 60)  BP: 128/57 (01 Dec 2017 05:44) (128/57 - 150/72)  BP(mean): --  RR: 18 (01 Dec 2017 05:44) (18 - 18)  SpO2: 94% (01 Dec 2017 05:44) (94% - 100%)    PHYSICAL EXAM:  GENERAL: NAD, well-groomed, well-developed  HEAD:  Atraumatic, Normocephalic  EYES: EOMI, PERRLA, conjunctiva and sclera clear  ENMT: No tonsillar erythema, exudates, or enlargement; Moist mucous membranes, Good dentition, No lesions  NECK: Supple, No JVD, Normal thyroid  NERVOUS SYSTEM:  Alert & Oriented X3, Good concentration; Motor Strength 5/5 B/L upper and lower extremities; DTRs 2+ intact and symmetric  CHEST/LUNG: Clear to percussion bilaterally; No rales, rhonchi, wheezing, or rubs  HEART: Regular rate and rhythm; No murmurs, rubs, or gallops  ABDOMEN: Soft, Nontender, Nondistended; Bowel sounds present. Bowel sounds sluggish.  EXTREMITIES:  2+ Peripheral Pulses, No clubbing, cyanosis, or edema  LYMPH: No lymphadenopathy noted  SKIN: No rashes or lesions    LABS:                        9.4    7.0   )-----------( 238      ( 01 Dec 2017 06:19 )             29.7     12-01    147<H>  |  112<H>  |  48<H>  ----------------------------<  65<L>  4.0   |  25  |  1.24    Ca    8.4<L>      01 Dec 2017 06:19  Phos  2.4     12-01  Mg     2.7     12-01        PT/INR - ( 01 Dec 2017 06:19 )   PT: 14.1 sec;   INR: 1.29 ratio             CAPILLARY BLOOD GLUCOSE                    RADIOLOGY & ADDITIONAL TESTS:    Imaging Personally Reviewed:  [ ] YES  [ ] NO    Consultant(s) Notes Reviewed:  [ ]x YES  [ ] NO    Care Discussed with Consultants/Other Providers [ ] YES  [ ] NO    Care discussed with family,         [  ]   yes  [  ]  No    imp:    stable[ ]    unstable[  ]     improving [  x ]       unchanged  [  ]                Plans:  Continue present plans  [ x ] Post op surgrry foloow up . advance diet as per surgery.  Coumadin 3 mg today.                New consult [  ]   specialty  .......               order test[  ]    test name. INR.                 Discharge Planning  [  ]

## 2017-12-02 LAB
ANION GAP SERPL CALC-SCNC: 7 MMOL/L — SIGNIFICANT CHANGE UP (ref 5–17)
BUN SERPL-MCNC: 36 MG/DL — HIGH (ref 7–23)
CALCIUM SERPL-MCNC: 8.4 MG/DL — LOW (ref 8.5–10.1)
CHLORIDE SERPL-SCNC: 110 MMOL/L — HIGH (ref 96–108)
CO2 SERPL-SCNC: 27 MMOL/L — SIGNIFICANT CHANGE UP (ref 22–31)
CREAT SERPL-MCNC: 1.12 MG/DL — SIGNIFICANT CHANGE UP (ref 0.5–1.3)
GLUCOSE SERPL-MCNC: 69 MG/DL — LOW (ref 70–99)
HCT VFR BLD CALC: 32 % — LOW (ref 39–50)
HGB BLD-MCNC: 9.9 G/DL — LOW (ref 13–17)
INR BLD: 1.56 RATIO — HIGH (ref 0.88–1.16)
LACTATE SERPL-SCNC: 0.8 MMOL/L — SIGNIFICANT CHANGE UP (ref 0.7–2)
MAGNESIUM SERPL-MCNC: 2.5 MG/DL — SIGNIFICANT CHANGE UP (ref 1.6–2.6)
MCHC RBC-ENTMCNC: 30.8 GM/DL — LOW (ref 32–36)
MCHC RBC-ENTMCNC: 32 PG — SIGNIFICANT CHANGE UP (ref 27–34)
MCV RBC AUTO: 103.8 FL — HIGH (ref 80–100)
NT-PROBNP SERPL-SCNC: HIGH PG/ML (ref 0–450)
PHOSPHATE SERPL-MCNC: 2.3 MG/DL — LOW (ref 2.5–4.5)
PLATELET # BLD AUTO: 255 K/UL — SIGNIFICANT CHANGE UP (ref 150–400)
POTASSIUM SERPL-MCNC: 4.3 MMOL/L — SIGNIFICANT CHANGE UP (ref 3.5–5.3)
POTASSIUM SERPL-SCNC: 4.3 MMOL/L — SIGNIFICANT CHANGE UP (ref 3.5–5.3)
PROTHROM AB SERPL-ACNC: 17.2 SEC — HIGH (ref 9.8–12.7)
RBC # BLD: 3.08 M/UL — LOW (ref 4.2–5.8)
RBC # FLD: 13.9 % — SIGNIFICANT CHANGE UP (ref 11–15)
SODIUM SERPL-SCNC: 144 MMOL/L — SIGNIFICANT CHANGE UP (ref 135–145)
WBC # BLD: 6.9 K/UL — SIGNIFICANT CHANGE UP (ref 3.8–10.5)
WBC # FLD AUTO: 6.9 K/UL — SIGNIFICANT CHANGE UP (ref 3.8–10.5)

## 2017-12-02 RX ORDER — POTASSIUM PHOSPHATE, MONOBASIC POTASSIUM PHOSPHATE, DIBASIC 236; 224 MG/ML; MG/ML
15 INJECTION, SOLUTION INTRAVENOUS ONCE
Qty: 0 | Refills: 0 | Status: COMPLETED | OUTPATIENT
Start: 2017-12-02 | End: 2017-12-02

## 2017-12-02 RX ORDER — FUROSEMIDE 40 MG
20 TABLET ORAL ONCE
Qty: 0 | Refills: 0 | Status: COMPLETED | OUTPATIENT
Start: 2017-12-02 | End: 2017-12-02

## 2017-12-02 RX ORDER — SODIUM CHLORIDE 9 MG/ML
1000 INJECTION, SOLUTION INTRAVENOUS
Qty: 0 | Refills: 0 | Status: DISCONTINUED | OUTPATIENT
Start: 2017-12-02 | End: 2017-12-07

## 2017-12-02 RX ORDER — WARFARIN SODIUM 2.5 MG/1
3 TABLET ORAL ONCE
Qty: 0 | Refills: 0 | Status: COMPLETED | OUTPATIENT
Start: 2017-12-02 | End: 2017-12-02

## 2017-12-02 RX ORDER — IPRATROPIUM/ALBUTEROL SULFATE 18-103MCG
3 AEROSOL WITH ADAPTER (GRAM) INHALATION ONCE
Qty: 0 | Refills: 0 | Status: COMPLETED | OUTPATIENT
Start: 2017-12-02 | End: 2017-12-02

## 2017-12-02 RX ADMIN — BRIMONIDINE TARTRATE 1 DROP(S): 2 SOLUTION/ DROPS OPHTHALMIC at 17:54

## 2017-12-02 RX ADMIN — SODIUM CHLORIDE 50 MILLILITER(S): 9 INJECTION, SOLUTION INTRAVENOUS at 23:23

## 2017-12-02 RX ADMIN — Medication 81 MILLIGRAM(S): at 11:57

## 2017-12-02 RX ADMIN — Medication 10 MILLIGRAM(S): at 17:57

## 2017-12-02 RX ADMIN — GABAPENTIN 300 MILLIGRAM(S): 400 CAPSULE ORAL at 11:56

## 2017-12-02 RX ADMIN — BRIMONIDINE TARTRATE 1 DROP(S): 2 SOLUTION/ DROPS OPHTHALMIC at 06:16

## 2017-12-02 RX ADMIN — Medication 650 MILLIGRAM(S): at 22:15

## 2017-12-02 RX ADMIN — HEPARIN SODIUM 5000 UNIT(S): 5000 INJECTION INTRAVENOUS; SUBCUTANEOUS at 06:16

## 2017-12-02 RX ADMIN — WARFARIN SODIUM 3 MILLIGRAM(S): 2.5 TABLET ORAL at 22:15

## 2017-12-02 RX ADMIN — POTASSIUM PHOSPHATE, MONOBASIC POTASSIUM PHOSPHATE, DIBASIC 63.75 MILLIMOLE(S): 236; 224 INJECTION, SOLUTION INTRAVENOUS at 11:55

## 2017-12-02 RX ADMIN — SODIUM CHLORIDE 50 MILLILITER(S): 9 INJECTION, SOLUTION INTRAVENOUS at 11:58

## 2017-12-02 RX ADMIN — Medication 3 MILLILITER(S): at 11:31

## 2017-12-02 RX ADMIN — QUETIAPINE FUMARATE 25 MILLIGRAM(S): 200 TABLET, FILM COATED ORAL at 06:16

## 2017-12-02 RX ADMIN — CARVEDILOL PHOSPHATE 3.12 MILLIGRAM(S): 80 CAPSULE, EXTENDED RELEASE ORAL at 06:16

## 2017-12-02 RX ADMIN — Medication 650 MILLIGRAM(S): at 23:00

## 2017-12-02 RX ADMIN — CARVEDILOL PHOSPHATE 3.12 MILLIGRAM(S): 80 CAPSULE, EXTENDED RELEASE ORAL at 17:54

## 2017-12-02 RX ADMIN — QUETIAPINE FUMARATE 25 MILLIGRAM(S): 200 TABLET, FILM COATED ORAL at 22:15

## 2017-12-02 RX ADMIN — Medication 20 MILLIGRAM(S): at 11:56

## 2017-12-02 RX ADMIN — QUETIAPINE FUMARATE 25 MILLIGRAM(S): 200 TABLET, FILM COATED ORAL at 17:54

## 2017-12-02 RX ADMIN — HEPARIN SODIUM 5000 UNIT(S): 5000 INJECTION INTRAVENOUS; SUBCUTANEOUS at 17:54

## 2017-12-02 NOTE — PROGRESS NOTE ADULT - SUBJECTIVE AND OBJECTIVE BOX
Patient is a 89y old  Male who presents with a chief complaint of Coffee ground emesis, abdominal distention, and upper abdominal pain x 3 days (26 Nov 2017 01:47)  had diastolic heart filure which has improved.  INR 1.56/ Phosphate 2.2  INTERVAL HPI/OVERNIGHT EVENTS: not orthopneic. no fever.     MEDICATIONS  (STANDING):  ALBUTerol/ipratropium for Nebulization. 3 milliLiter(s) Nebulizer once  aspirin  chewable 81 milliGRAM(s) Oral daily  brimonidine 0.2% Ophthalmic Solution 1 Drop(s) Both EYES two times a day  carvedilol 3.125 milliGRAM(s) Oral every 12 hours  dextrose 5% + sodium chloride 0.45%. 1000 milliLiter(s) (50 mL/Hr) IV Continuous <Continuous>  furosemide   Injectable 20 milliGRAM(s) IV Push once  gabapentin 300 milliGRAM(s) Oral daily  heparin  Injectable 5000 Unit(s) SubCutaneous every 12 hours  potassium phosphate IVPB 15 milliMole(s) IV Intermittent once  QUEtiapine 25 milliGRAM(s) Oral three times a day  warfarin 3 milliGRAM(s) Oral once    MEDICATIONS  (PRN):  acetaminophen   Tablet. 650 milliGRAM(s) Oral every 6 hours PRN Mild Pain (1 - 3)  benzocaine 15 mG/menthol 3.6 mG Lozenge 1 Lozenge Oral every 4 hours PRN Sore Throat  fentaNYL    Injectable 50 MICROGram(s) IV Push every 4 hours PRN Moderate Pain (4 - 6)      Allergies    No Known Allergies    Intolerances        REVIEW OF SYSTEMS:  CONSTITUTIONAL: No fever, weight loss, or fatigue  EYES: No eye pain, visual disturbances, or discharge  ENMT:  No difficulty hearing, tinnitus, vertigo; No sinus or throat pain  NECK: No pain or stiffness  BREASTS: No pain, masses, or nipple discharge  RESPIRATORY: No cough, wheezing, chills or hemoptysis; No shortness of breath  CARDIOVASCULAR: No chest pain, palpitations, dizziness, or leg swelling  GASTROINTESTINAL: No abdominal or epigastric pain. No nausea, vomiting, or hematemesis; No diarrhea or constipation. No melena or hematochezia.  GENITOURINARY: No dysuria, frequency, hematuria, or incontinence  NEUROLOGICAL: No headaches, memory loss, loss of strength, numbness, or tremors  SKIN: No itching, burning, rashes, or lesions   LYMPH NODES: No enlarged glands  ENDOCRINE: No heat or cold intolerance; No hair loss  MUSCULOSKELETAL: No joint pain or swelling; No muscle, back, or extremity pain  PSYCHIATRIC: No depression, anxiety, mood swings, or difficulty sleeping  HEME/LYMPH: No easy bruising, or bleeding gums  ALLERY AND IMMUNOLOGIC: No hives or eczema    Vital Signs Last 24 Hrs  T(C): 37.7 (02 Dec 2017 05:00), Max: 37.7 (02 Dec 2017 05:00)  T(F): 99.8 (02 Dec 2017 05:00), Max: 99.8 (02 Dec 2017 05:00)  HR: 89 (02 Dec 2017 05:00) (60 - 90)  BP: 116/65 (02 Dec 2017 05:00) (116/65 - 152/67)  BP(mean): --  RR: 18 (02 Dec 2017 05:00) (16 - 20)  SpO2: 98% (02 Dec 2017 05:00) (98% - 100%)    PHYSICAL EXAM:  GENERAL: NAD, well-groomed, well-developed  HEAD:  Atraumatic, Normocephalic  EYES: EOMI, PERRLA, conjunctiva and sclera clear  ENMT: No tonsillar erythema, exudates, or enlargement; Moist mucous membranes, Good dentition, No lesions  NECK: Supple, No JVD, Normal thyroid  NERVOUS SYSTEM:  Alert & Oriented X3, Good concentration; Motor Strength 5/5 B/L upper and lower extremities; DTRs 2+ intact and symmetric  CHEST/LUNG: Clear to percussion bilaterally; No rales, rhonchi,  Nowheezing, or rubs  HEART: Regular rate and rhythm; No murmurs, rubs, or gallops  ABDOMEN: Soft, Nontender, Nondistended; Bowel sounds sluggish  EXTREMITIES:  2+ Peripheral Pulses, No clubbing, cyanosis, or edema  LYMPH: No lymphadenopathy noted  SKIN: No rashes or lesions    LABS:                        9.9    6.9   )-----------( 255      ( 02 Dec 2017 07:41 )             32.0     12-02    144  |  110<H>  |  36<H>  ----------------------------<  69<L>  4.3   |  27  |  1.12    Ca    8.4<L>      02 Dec 2017 07:41  Phos  2.3     12-02  Mg     2.5     12-02        PT/INR - ( 02 Dec 2017 07:41 )   PT: 17.2 sec;   INR: 1.56 ratio             CAPILLARY BLOOD GLUCOSE                    RADIOLOGY & ADDITIONAL TESTS:    Imaging Personally Reviewed:  [ ] YES  [ ] NO    Consultant(s) Notes Reviewed:  [ ] YES  [ ] NO    Care Discussed with Consultants/Other Providers [ ] YES  [ ] NO    Care discussed with family,         [  ]   yes  [  ]  No    imp:    stable[ ]    unstable[  ]     improving [  x ]       unchanged  [  ]                Plans:  Continue present plans  [ x ] phosphate supplementation . coumain 3mg today               New consult [  ]   specialty  .......               order test[  ]    test name.   chest X-ray, ProBNP, MG++ and P++ levels in AM                Discharge Planning  [  ]

## 2017-12-02 NOTE — PROGRESS NOTE ADULT - SUBJECTIVE AND OBJECTIVE BOX
Postoperative Day #: 6  Patient seen and examined bedside with Dr Hansen, covering for Dr Starks.  Telemetry was d/c'ed yesterday per cardio.  Pt c/o feeling "terrible". Admits to some difficulty breathing at times.  No BM yet. Reports some abdominal soreness due to binder.    T(F): 99.8 (12-02-17 @ 05:00), Max: 99.8 (12-02-17 @ 05:00)  HR: 89 (12-02-17 @ 05:00) (60 - 90)  BP: 116/65 (12-02-17 @ 05:00) (116/65 - 152/67)  RR: 18 (12-02-17 @ 05:00) (16 - 20)  SpO2: 98% (12-02-17 @ 05:00) (98% - 100%)      PHYSICAL EXAM:  General: NAD, WDWN. Alert and awake  HEENT: NCAT, EOMI, conjunctiva clear  CV: +S1+S2 regular rate and rhythm  Lung: bilateral wheezing and increased work of breathing. good air entry b/l  Abdomen: soft, NT. Less distended. Midline dressing CDI. Hypoactive BS  Extremities: no pedal edema or calf tenderness noted     LABS:                        9.9    6.9   )-----------( 255      ( 02 Dec 2017 07:41 )             32.0     12-02    144  |  110<H>  |  36<H>  ----------------------------<  69<L>  4.3   |  27  |  1.12    Ca    8.4<L>      02 Dec 2017 07:41  Phos  2.3     12-02  Mg     2.5     12-02  PT/INR - ( 02 Dec 2017 07:41 )   PT: 17.2 sec;   INR: 1.56 ratio      < from: TTE Echo Doppler w/o Cont (11.30.17 @ 18:32) >   1. Left ventricular ejection fraction, by visual estimation, is 55 to   60%.   2. Spectral Doppler shows restrictive pattern of left ventricular   myocardial filling (Grade III diastolic dysfunction).   3. Mildly dilated right atrium.   4. Moderate pleural effusion in the left lateral region.    < end of copied text >      Impression: 89y Male with PMH Afib, PPM, CHF, HTN, Peripheral neuropathy, CVA, GERD, BPH, Prostate cancer, hip fx admitted with ischemic bowel 2/2 incarcerated R inguinal hernia POD #6 s/p ex lap, BEATRIS, R inguinal hernia reduction.     Plan:  -await bowel function. continue NPO with sips/chips, may need parental nutrition if no return of bowel function  -resume IVF at 50cc/hr, D5 1/2NS ordered.  -continue coumadin, Monitor INR. Cardio, IM and nephrology Follow up appreciated.  -albuterol respiratory treatment and lasix x 1 dose ordered stat  -Continue PT, increase activity and OOB  -continue local wound care  -f/u AM labs

## 2017-12-03 LAB — GLUCOSE BLDC GLUCOMTR-MCNC: 127 MG/DL — HIGH (ref 70–99)

## 2017-12-03 PROCEDURE — 71010: CPT | Mod: 26

## 2017-12-03 RX ORDER — FUROSEMIDE 40 MG
20 TABLET ORAL ONCE
Qty: 0 | Refills: 0 | Status: COMPLETED | OUTPATIENT
Start: 2017-12-03 | End: 2017-12-03

## 2017-12-03 RX ORDER — WARFARIN SODIUM 2.5 MG/1
3 TABLET ORAL ONCE
Qty: 0 | Refills: 0 | Status: COMPLETED | OUTPATIENT
Start: 2017-12-03 | End: 2017-12-03

## 2017-12-03 RX ORDER — ACETAMINOPHEN 500 MG
1000 TABLET ORAL ONCE
Qty: 0 | Refills: 0 | Status: COMPLETED | OUTPATIENT
Start: 2017-12-03 | End: 2017-12-03

## 2017-12-03 RX ADMIN — CARVEDILOL PHOSPHATE 3.12 MILLIGRAM(S): 80 CAPSULE, EXTENDED RELEASE ORAL at 05:28

## 2017-12-03 RX ADMIN — BRIMONIDINE TARTRATE 1 DROP(S): 2 SOLUTION/ DROPS OPHTHALMIC at 18:05

## 2017-12-03 RX ADMIN — Medication 650 MILLIGRAM(S): at 05:27

## 2017-12-03 RX ADMIN — QUETIAPINE FUMARATE 25 MILLIGRAM(S): 200 TABLET, FILM COATED ORAL at 22:07

## 2017-12-03 RX ADMIN — Medication 20 MILLIGRAM(S): at 13:45

## 2017-12-03 RX ADMIN — CARVEDILOL PHOSPHATE 3.12 MILLIGRAM(S): 80 CAPSULE, EXTENDED RELEASE ORAL at 18:05

## 2017-12-03 RX ADMIN — Medication 1000 MILLIGRAM(S): at 15:00

## 2017-12-03 RX ADMIN — Medication 81 MILLIGRAM(S): at 13:58

## 2017-12-03 RX ADMIN — QUETIAPINE FUMARATE 25 MILLIGRAM(S): 200 TABLET, FILM COATED ORAL at 13:45

## 2017-12-03 RX ADMIN — SODIUM CHLORIDE 50 MILLILITER(S): 9 INJECTION, SOLUTION INTRAVENOUS at 13:44

## 2017-12-03 RX ADMIN — Medication 400 MILLIGRAM(S): at 13:57

## 2017-12-03 RX ADMIN — HEPARIN SODIUM 5000 UNIT(S): 5000 INJECTION INTRAVENOUS; SUBCUTANEOUS at 05:29

## 2017-12-03 RX ADMIN — HEPARIN SODIUM 5000 UNIT(S): 5000 INJECTION INTRAVENOUS; SUBCUTANEOUS at 18:05

## 2017-12-03 RX ADMIN — GABAPENTIN 300 MILLIGRAM(S): 400 CAPSULE ORAL at 13:59

## 2017-12-03 RX ADMIN — WARFARIN SODIUM 3 MILLIGRAM(S): 2.5 TABLET ORAL at 22:07

## 2017-12-03 RX ADMIN — QUETIAPINE FUMARATE 25 MILLIGRAM(S): 200 TABLET, FILM COATED ORAL at 05:28

## 2017-12-03 RX ADMIN — Medication 650 MILLIGRAM(S): at 06:20

## 2017-12-03 RX ADMIN — BRIMONIDINE TARTRATE 1 DROP(S): 2 SOLUTION/ DROPS OPHTHALMIC at 05:28

## 2017-12-03 NOTE — PROGRESS NOTE ADULT - SUBJECTIVE AND OBJECTIVE BOX
NPatient seen and examined at bedside in no distress.  Without complaints.  Admits to small  overnight.  Denies fever, chills, chest pain, sob, cough.    Vital Signs Last 24 Hrs  T(F): 97.9 (12-03-17 @ 00:00), Max: 97.9 (12-03-17 @ 00:00)  HR: 60 (12-03-17 @ 04:54)  BP: 139/67 (12-03-17 @ 04:54)  RR: 18 (12-03-17 @ 04:54)  SpO2: 98% (12-03-17 @ 04:54)    GENERAL: Alert, awake, NAD  HEENT: NC in place  CHEST/LUNG: Coarse breath sounds lower lobes, good air entry b/l, respirations nonlabored  HEART: S1S2, Regular rate and rhythm  ABDOMEN: + Bowel sounds x4 quadrants. Abd binder in place. Removed. Midline dressing c/d/i. Removed to reveal incision with intermittent staples c/d/i. No signs of infection. New dry, sterile dressing applied. Soft, nondistended, nontender.   EXTREMITIES: No calf tenderness, no pedal edema b/l     AM labs pending    Impression: 89 year old male PMH Afib, PPM, CHF, HTN, Peripheral neuropathy, CVA, GERD, BPH, Prostate cancer, hip fx, admitted with ischemic bowel 2/2 incarcerated R inguinal hernia POD #7 s/p ex lap, BEATRIS, R inguinal hernia reduction.   Plan:  - speech and swallow today, will advance diet pending evaluation  - continue coumadin, cardio f/u   - f/u CXR  - OOB with PT  - local wound care per surgical team   - continue medicine follow up   - f/u AM labs  - will d/w surgical attending

## 2017-12-03 NOTE — PROGRESS NOTE ADULT - SUBJECTIVE AND OBJECTIVE BOX
Patient is a 89y old  Male who presents with a chief complaint of Coffee ground emesis, abdominal distention, and upper abdominal pain x 3 days (26 Nov 2017 01:47)  s/p BEATRIS, exploratory lap, A. Fib, s/p diastolic CHF.     INTERVAL HPI/OVERNIGHT EVENTS:uneventful. pt refused labs today.     MEDICATIONS  (STANDING):  aspirin  chewable 81 milliGRAM(s) Oral daily  brimonidine 0.2% Ophthalmic Solution 1 Drop(s) Both EYES two times a day  carvedilol 3.125 milliGRAM(s) Oral every 12 hours  dextrose 5% + sodium chloride 0.45%. 1000 milliLiter(s) (50 mL/Hr) IV Continuous <Continuous>  furosemide   Injectable 20 milliGRAM(s) IV Push once  gabapentin 300 milliGRAM(s) Oral daily  heparin  Injectable 5000 Unit(s) SubCutaneous every 12 hours  QUEtiapine 25 milliGRAM(s) Oral three times a day  warfarin 3 milliGRAM(s) Oral once    MEDICATIONS  (PRN):  acetaminophen   Tablet. 650 milliGRAM(s) Oral every 6 hours PRN Mild Pain (1 - 3)  benzocaine 15 mG/menthol 3.6 mG Lozenge 1 Lozenge Oral every 4 hours PRN Sore Throat  bisacodyl Suppository 10 milliGRAM(s) Rectal daily PRN Constipation  fentaNYL    Injectable 50 MICROGram(s) IV Push every 4 hours PRN Moderate Pain (4 - 6)      Allergies    No Known Allergies    Intolerances        REVIEW OF SYSTEMS:  CONSTITUTIONAL: No fever, weight loss, or fatigue  EYES: No eye pain, visual disturbances, or discharge  ENMT:  No difficulty hearing, tinnitus, vertigo; No sinus or throat pain  NECK: No pain or stiffness  BREASTS: No pain, masses, or nipple discharge  RESPIRATORY: No cough, wheezing, chills or hemoptysis; No shortness of breath  CARDIOVASCULAR: No chest pain, palpitations, dizziness, or leg swelling  GASTROINTESTINAL: No abdominal or epigastric pain. No nausea, vomiting, or hematemesis; No diarrhea or constipation. No melena or hematochezia.  GENITOURINARY: No dysuria, frequency, hematuria, or incontinence  NEUROLOGICAL: No headaches, memory loss, loss of strength, numbness, or tremors  SKIN: No itching, burning, rashes, or lesions   LYMPH NODES: No enlarged glands  ENDOCRINE: No heat or cold intolerance; No hair loss  MUSCULOSKELETAL: No joint pain or swelling; No muscle, back, or extremity pain  PSYCHIATRIC: No depression, anxiety, mood swings, or difficulty sleeping  HEME/LYMPH: No easy bruising, or bleeding gums  ALLERY AND IMMUNOLOGIC: No hives or eczema    Vital Signs Last 24 Hrs  T(C): 36.6 (03 Dec 2017 00:00), Max: 36.6 (02 Dec 2017 12:30)  T(F): 97.9 (03 Dec 2017 00:00), Max: 97.9 (03 Dec 2017 00:00)  HR: 60 (03 Dec 2017 04:54) (60 - 88)  BP: 139/67 (03 Dec 2017 04:54) (112/56 - 139/67)  BP(mean): --  RR: 18 (03 Dec 2017 04:54) (17 - 18)  SpO2: 98% (03 Dec 2017 04:54) (96% - 100%)    PHYSICAL EXAM:  GENERAL: NAD, well-groomed, well-developed  HEAD:  Atraumatic, Normocephalic  EYES: EOMI, PERRLA, conjunctiva and sclera clear  ENMT: No tonsillar erythema, exudates, or enlargement; Moist mucous membranes, Good dentition, No lesions  NECK: Supple, No JVD, Normal thyroid  NERVOUS SYSTEM:  Alert & Oriented X3, Good concentration; Motor Strength 5/5 B/L upper and lower extremities; DTRs 2+ intact and symmetric  CHEST/LUNG: Clear to percussion bilaterally; No rales, rhonchi, wheezing, or rubs  HEART: Regular rate and rhythm; No murmurs, rubs, or gallops  ABDOMEN: Soft, Nontender, Nondistended; Bowel sounds present  EXTREMITIES:  2+ Peripheral Pulses, No clubbing, cyanosis, or edema  LYMPH: No lymphadenopathy noted  SKIN: No rashes or lesions    LABS:                        9.9    6.9   )-----------( 255      ( 02 Dec 2017 07:41 )             32.0     12-02    144  |  110<H>  |  36<H>  ----------------------------<  69<L>  4.3   |  27  |  1.12    Ca    8.4<L>      02 Dec 2017 07:41  Phos  2.3     12-02  Mg     2.5     12-02        PT/INR - ( 02 Dec 2017 07:41 )   PT: 17.2 sec;   INR: 1.56 ratio             CAPILLARY BLOOD GLUCOSE                    RADIOLOGY & ADDITIONAL TESTS:    Imaging Personally Reviewed:  [ ] YES  [ ] NO    Consultant(s) Notes Reviewed:  [ ] YES  [ ] NO    Care Discussed with Consultants/Other Providers [ ] YES  [ ] NO    Care discussed with family,         [  ]   yes  [  ]  No    imp:    stable[ ]    unstable[  ]     improving [  x ]       unchanged  [  ]                Plans:  Continue present plans  [x  ] will give coumadin 3 mg today. INR in AM               New consult [  ]   specialty  .......               order test[  ]    test name.                  Discharge Planning  [  ]

## 2017-12-04 LAB
ANION GAP SERPL CALC-SCNC: 5 MMOL/L — SIGNIFICANT CHANGE UP (ref 5–17)
BUN SERPL-MCNC: 21 MG/DL — SIGNIFICANT CHANGE UP (ref 7–23)
CALCIUM SERPL-MCNC: 8.3 MG/DL — LOW (ref 8.5–10.1)
CHLORIDE SERPL-SCNC: 107 MMOL/L — SIGNIFICANT CHANGE UP (ref 96–108)
CO2 SERPL-SCNC: 31 MMOL/L — SIGNIFICANT CHANGE UP (ref 22–31)
CREAT SERPL-MCNC: 1.12 MG/DL — SIGNIFICANT CHANGE UP (ref 0.5–1.3)
GLUCOSE SERPL-MCNC: 140 MG/DL — HIGH (ref 70–99)
HCT VFR BLD CALC: 32.2 % — LOW (ref 39–50)
HGB BLD-MCNC: 10 G/DL — LOW (ref 13–17)
INR BLD: 3.17 RATIO — HIGH (ref 0.88–1.16)
MAGNESIUM SERPL-MCNC: 2.2 MG/DL — SIGNIFICANT CHANGE UP (ref 1.6–2.6)
MCHC RBC-ENTMCNC: 31 GM/DL — LOW (ref 32–36)
MCHC RBC-ENTMCNC: 31.8 PG — SIGNIFICANT CHANGE UP (ref 27–34)
MCV RBC AUTO: 102.6 FL — HIGH (ref 80–100)
PHOSPHATE SERPL-MCNC: 1.7 MG/DL — LOW (ref 2.5–4.5)
PLATELET # BLD AUTO: 284 K/UL — SIGNIFICANT CHANGE UP (ref 150–400)
POTASSIUM SERPL-MCNC: 4 MMOL/L — SIGNIFICANT CHANGE UP (ref 3.5–5.3)
POTASSIUM SERPL-SCNC: 4 MMOL/L — SIGNIFICANT CHANGE UP (ref 3.5–5.3)
PROTHROM AB SERPL-ACNC: 35.4 SEC — HIGH (ref 9.8–12.7)
RBC # BLD: 3.13 M/UL — LOW (ref 4.2–5.8)
RBC # FLD: 13.8 % — SIGNIFICANT CHANGE UP (ref 11–15)
SODIUM SERPL-SCNC: 143 MMOL/L — SIGNIFICANT CHANGE UP (ref 135–145)
WBC # BLD: 6.8 K/UL — SIGNIFICANT CHANGE UP (ref 3.8–10.5)
WBC # FLD AUTO: 6.8 K/UL — SIGNIFICANT CHANGE UP (ref 3.8–10.5)

## 2017-12-04 RX ORDER — MORPHINE SULFATE 50 MG/1
2 CAPSULE, EXTENDED RELEASE ORAL ONCE
Qty: 0 | Refills: 0 | Status: DISCONTINUED | OUTPATIENT
Start: 2017-12-04 | End: 2017-12-04

## 2017-12-04 RX ORDER — POTASSIUM PHOSPHATE, MONOBASIC POTASSIUM PHOSPHATE, DIBASIC 236; 224 MG/ML; MG/ML
15 INJECTION, SOLUTION INTRAVENOUS ONCE
Qty: 0 | Refills: 0 | Status: COMPLETED | OUTPATIENT
Start: 2017-12-04 | End: 2017-12-04

## 2017-12-04 RX ORDER — ACETAMINOPHEN 500 MG
1000 TABLET ORAL ONCE
Qty: 0 | Refills: 0 | Status: COMPLETED | OUTPATIENT
Start: 2017-12-04 | End: 2017-12-04

## 2017-12-04 RX ORDER — LANOLIN ALCOHOL/MO/W.PET/CERES
3 CREAM (GRAM) TOPICAL AT BEDTIME
Qty: 0 | Refills: 0 | Status: DISCONTINUED | OUTPATIENT
Start: 2017-12-04 | End: 2017-12-12

## 2017-12-04 RX ORDER — FENTANYL CITRATE 50 UG/ML
50 INJECTION INTRAVENOUS EVERY 6 HOURS
Qty: 0 | Refills: 0 | Status: DISCONTINUED | OUTPATIENT
Start: 2017-12-04 | End: 2017-12-06

## 2017-12-04 RX ADMIN — MORPHINE SULFATE 2 MILLIGRAM(S): 50 CAPSULE, EXTENDED RELEASE ORAL at 16:24

## 2017-12-04 RX ADMIN — Medication 650 MILLIGRAM(S): at 22:23

## 2017-12-04 RX ADMIN — QUETIAPINE FUMARATE 25 MILLIGRAM(S): 200 TABLET, FILM COATED ORAL at 06:54

## 2017-12-04 RX ADMIN — HEPARIN SODIUM 5000 UNIT(S): 5000 INJECTION INTRAVENOUS; SUBCUTANEOUS at 18:19

## 2017-12-04 RX ADMIN — Medication 81 MILLIGRAM(S): at 12:24

## 2017-12-04 RX ADMIN — Medication 400 MILLIGRAM(S): at 01:42

## 2017-12-04 RX ADMIN — QUETIAPINE FUMARATE 25 MILLIGRAM(S): 200 TABLET, FILM COATED ORAL at 21:23

## 2017-12-04 RX ADMIN — SODIUM CHLORIDE 50 MILLILITER(S): 9 INJECTION, SOLUTION INTRAVENOUS at 08:28

## 2017-12-04 RX ADMIN — Medication 1000 MILLIGRAM(S): at 02:00

## 2017-12-04 RX ADMIN — BRIMONIDINE TARTRATE 1 DROP(S): 2 SOLUTION/ DROPS OPHTHALMIC at 06:53

## 2017-12-04 RX ADMIN — Medication 650 MILLIGRAM(S): at 21:23

## 2017-12-04 RX ADMIN — MORPHINE SULFATE 2 MILLIGRAM(S): 50 CAPSULE, EXTENDED RELEASE ORAL at 16:35

## 2017-12-04 RX ADMIN — GABAPENTIN 300 MILLIGRAM(S): 400 CAPSULE ORAL at 12:25

## 2017-12-04 RX ADMIN — QUETIAPINE FUMARATE 25 MILLIGRAM(S): 200 TABLET, FILM COATED ORAL at 14:03

## 2017-12-04 RX ADMIN — CARVEDILOL PHOSPHATE 3.12 MILLIGRAM(S): 80 CAPSULE, EXTENDED RELEASE ORAL at 06:53

## 2017-12-04 RX ADMIN — POTASSIUM PHOSPHATE, MONOBASIC POTASSIUM PHOSPHATE, DIBASIC 63.75 MILLIMOLE(S): 236; 224 INJECTION, SOLUTION INTRAVENOUS at 10:52

## 2017-12-04 RX ADMIN — HEPARIN SODIUM 5000 UNIT(S): 5000 INJECTION INTRAVENOUS; SUBCUTANEOUS at 06:52

## 2017-12-04 NOTE — SWALLOW BEDSIDE ASSESSMENT ADULT - SLP GENERAL OBSERVATIONS
pt seen bedside, alert and oriented x3-4. pt responded to questions, verbalized needs and he inconsistently followed one step directions.

## 2017-12-04 NOTE — SWALLOW BEDSIDE ASSESSMENT ADULT - PHARYNGEAL PHASE
Within functional limits Within functional limits/pt grimaced /shuddered in pain and burped after trials.

## 2017-12-04 NOTE — SWALLOW BEDSIDE ASSESSMENT ADULT - NS SPL SWALLOW CLINIC TRIAL FT
x1 cough and pt expectorated thick white/yellow mucus. pt refused trials and yelled "that's too much food"

## 2017-12-04 NOTE — SWALLOW BEDSIDE ASSESSMENT ADULT - SWALLOW EVAL: PATIENT/FAMILY GOALS STATEMENT
pt wants milk and stated "this is the only food i've had in days". additionally pt asked to sit in the chair and he denied difficulty swallowing

## 2017-12-04 NOTE — SWALLOW BEDSIDE ASSESSMENT ADULT - COMMENTS
CXR 12/3/2017 The mediastinal cardiac silhouette is unremarkable. Cardiac pacemaker.  Bilateral effusions and likely pulmonary congestion..   No acute osseous finding.

## 2017-12-04 NOTE — SWALLOW BEDSIDE ASSESSMENT ADULT - SWALLOW EVAL: RECOMMENDED FEEDING/EATING TECHNIQUES
allow for swallow between intakes/maintain upright posture during/after eating for 30 mins/small sips/bites/as tolerated

## 2017-12-04 NOTE — SWALLOW BEDSIDE ASSESSMENT ADULT - POSITIONING
pt refused to elevate HOB and yelled "too much" despite education and he requested to be in the chair/reclined pt yelled "get the f**ing milk"/reclined reclined

## 2017-12-04 NOTE — SWALLOW BEDSIDE ASSESSMENT ADULT - SLP PERTINENT HISTORY OF CURRENT PROBLEM
pt admitted with ischemic bowel 2/2 incarcerated R inguinal hernia POD #8 s/p ex lap, BEATRIS, R inguinal hernia reduction, restarted on coumadin for afib Patient is a 89y old  Male who presents with a chief complaint of Coffee ground emesis, abdominal distention, and upper abdominal pain x 3 days (26 Nov 2017 01:47)

## 2017-12-04 NOTE — PROGRESS NOTE ADULT - SUBJECTIVE AND OBJECTIVE BOX
Patient is a 89y old  Male who presents with a chief complaint of Coffee ground emesis, abdominal distention, and upper abdominal pain x 3 days (26 Nov 2017 01:47)  s/p laporotomy. BEATRIS. Chronic A. Fib. Gerd, old cva, dementia    INTERVAL HPI/OVERNIGHT EVENTS: uneventful . no dyspnea.    MEDICATIONS  (STANDING):  aspirin  chewable 81 milliGRAM(s) Oral daily  brimonidine 0.2% Ophthalmic Solution 1 Drop(s) Both EYES two times a day  carvedilol 3.125 milliGRAM(s) Oral every 12 hours  dextrose 5% + sodium chloride 0.45%. 1000 milliLiter(s) (50 mL/Hr) IV Continuous <Continuous>  gabapentin 300 milliGRAM(s) Oral daily  heparin  Injectable 5000 Unit(s) SubCutaneous every 12 hours  potassium phosphate IVPB 15 milliMole(s) IV Intermittent once  QUEtiapine 25 milliGRAM(s) Oral three times a day    MEDICATIONS  (PRN):  acetaminophen   Tablet. 650 milliGRAM(s) Oral every 6 hours PRN Mild Pain (1 - 3)  benzocaine 15 mG/menthol 3.6 mG Lozenge 1 Lozenge Oral every 4 hours PRN Sore Throat  bisacodyl Suppository 10 milliGRAM(s) Rectal daily PRN Constipation      Allergies    No Known Allergies    Intolerances        REVIEW OF SYSTEMS:  CONSTITUTIONAL: No fever, weight loss, or fatigue  EYES: No eye pain, visual disturbances, or discharge  ENMT:  No difficulty hearing, tinnitus, vertigo; No sinus or throat pain  NECK: No pain or stiffness  BREASTS: No pain, masses, or nipple discharge  RESPIRATORY: No cough, wheezing, chills or hemoptysis; No shortness of breath  CARDIOVASCULAR: No chest pain, palpitations, dizziness, or leg swelling  GASTROINTESTINAL: No abdominal or epigastric pain. No nausea, vomiting, or hematemesis; No diarrhea or constipation. No melena or hematochezia.  GENITOURINARY: No dysuria, frequency, hematuria, or incontinence  NEUROLOGICAL: No headaches, memory loss, loss of strength, numbness, or tremors  SKIN: No itching, burning, rashes, or lesions   LYMPH NODES: No enlarged glands  ENDOCRINE: No heat or cold intolerance; No hair loss  MUSCULOSKELETAL: No joint pain or swelling; No muscle, back, or extremity pain  PSYCHIATRIC: No depression, anxiety, mood swings, or difficulty sleeping  HEME/LYMPH: No easy bruising, or bleeding gums  ALLERY AND IMMUNOLOGIC: No hives or eczema    Vital Signs Last 24 Hrs  T(C): 36.3 (04 Dec 2017 05:00), Max: 36.8 (03 Dec 2017 17:35)  T(F): 97.4 (04 Dec 2017 05:00), Max: 98.2 (03 Dec 2017 17:35)  HR: 60 (04 Dec 2017 05:00) (60 - 89)  BP: 132/57 (04 Dec 2017 05:00) (132/57 - 156/74)  BP(mean): --  RR: 18 (04 Dec 2017 05:00) (16 - 18)  SpO2: 99% (04 Dec 2017 05:00) (97% - 100%)    PHYSICAL EXAM:  GENERAL: NAD, well-groomed, well-developed  HEAD:  Atraumatic, Normocephalic  EYES: EOMI, PERRLA, conjunctiva and sclera clear  ENMT: No tonsillar erythema, exudates, or enlargement; Moist mucous membranes, Good dentition, No lesions  NECK: Supple, No JVD, Normal thyroid  NERVOUS SYSTEM:  Alert & Oriented X3, Good concentration; Motor Strength 5/5 B/L upper and lower extremities; DTRs 2+ intact and symmetric  CHEST/LUNG: Clear to percussion bilaterally; No rales, rhonchi, wheezing, or rubs  HEART: Regular rate and rhythm; No murmurs, rubs, or gallops  ABDOMEN: Soft, Nontender, Nondistended; Bowel sounds present  EXTREMITIES:  2+ Peripheral Pulses, No clubbing, cyanosis, or edema  LYMPH: No lymphadenopathy noted  SKIN: No rashes or lesions    LABS:                        10.0   6.8   )-----------( 284      ( 04 Dec 2017 08:05 )             32.2     12-04    143  |  107  |  21  ----------------------------<  140<H>  4.0   |  31  |  1.12    Ca    8.3<L>      04 Dec 2017 08:05  Phos  1.7     12-04  Mg     2.2     12-04        PT/INR - ( 04 Dec 2017 08:05 )   PT: 35.4 sec;   INR: 3.17 ratio             CAPILLARY BLOOD GLUCOSE      POCT Blood Glucose.: 127 mg/dL (03 Dec 2017 20:35)                RADIOLOGY & ADDITIONAL TESTS:    Imaging Personally Reviewed:  [ ] YES  [ ] NO    Consultant(s) Notes Reviewed:  [ ] YES  [ ] NO    Care Discussed with Consultants/Other Providers [ ] YES  [ ] NO    Care discussed with family,         [  ]   yes  [  ]  No    imp:    stable[x ]    unstable[  ]     improving [x   ]       unchanged  [  ]                Plans:  Continue present plans  [x  ] supplenet phosphorus               New consult [  ]   specialty  .......               order test[  ]    test name.                  Discharge Planning  [  ]

## 2017-12-04 NOTE — SWALLOW BEDSIDE ASSESSMENT ADULT - SWALLOW EVAL: DIAGNOSIS
pt presented with confusion/agitation and refusal therefor limiting the eval however oropharyngeal phases of swallow judged to be grossly WNL. possible esophageal discomfort and GERD

## 2017-12-04 NOTE — PROGRESS NOTE ADULT - SUBJECTIVE AND OBJECTIVE BOX
Patient seen and examined at bedside in no distress.  Admits to mild abdominal pain, well controlled with pain medication.  +flatus, large BM overnight.   Tolerating clear liquid diet, denies nausea and vomiting.  Denies fever, chills, chest pain, sob.    Vital Signs Last 24 Hrs  T(F): 97.4 (12-04-17 @ 05:00), Max: 98.2 (12-03-17 @ 17:35)  HR: 60 (12-04-17 @ 05:00)  BP: 132/57 (12-04-17 @ 05:00)  RR: 18 (12-04-17 @ 05:00)  SpO2: 99% (12-04-17 @ 05:00)    GENERAL: Alert, NAD  CHEST/LUNG: Clear to auscultation bilaterally, respirations nonlabored  HEART: S1S2, Regular rate and rhythm  ABDOMEN: + Bowel sounds, midline dressing c/d/i. Removed, midline incision with intermittent staples c/d/i. No signs of infection. Soft, nondistended, nontender.   EXTREMITIES: No calf tenderness, no pedal edema b/l    LABS:  New labs pending    Impression: 89 year old male PMH Afib, PPM, CHF, HTN, Peripheral neuropathy, CVA, GERD, BPH, Prostate cancer, hip fx, admitted with ischemic bowel 2/2 incarcerated R inguinal hernia POD #8 s/p ex lap, BEATRIS, R inguinal hernia reduction, restarted on coumadin for afib  Plan:  - speech and swallow prior to further advancement of diet  - f/u AM labs  - local wound care per surgical team  - PT reeval for increased activity   - DVT ppx, pain management PRN, incentive spirometer  - medical and cardio f/u  - will d/w Dr. Starks

## 2017-12-05 ENCOUNTER — TRANSCRIPTION ENCOUNTER (OUTPATIENT)
Age: 82
End: 2017-12-05

## 2017-12-05 LAB
ALBUMIN SERPL ELPH-MCNC: 2.3 G/DL — LOW (ref 3.3–5)
ALP SERPL-CCNC: 77 U/L — SIGNIFICANT CHANGE UP (ref 40–120)
ALT FLD-CCNC: 25 U/L — SIGNIFICANT CHANGE UP (ref 12–78)
ANION GAP SERPL CALC-SCNC: 4 MMOL/L — LOW (ref 5–17)
APPEARANCE UR: CLEAR — SIGNIFICANT CHANGE UP
AST SERPL-CCNC: 20 U/L — SIGNIFICANT CHANGE UP (ref 15–37)
BACTERIA # UR AUTO: ABNORMAL
BILIRUB SERPL-MCNC: 0.7 MG/DL — SIGNIFICANT CHANGE UP (ref 0.2–1.2)
BILIRUB UR-MCNC: NEGATIVE — SIGNIFICANT CHANGE UP
BUN SERPL-MCNC: 23 MG/DL — SIGNIFICANT CHANGE UP (ref 7–23)
CALCIUM SERPL-MCNC: 7.8 MG/DL — LOW (ref 8.5–10.1)
CHLORIDE SERPL-SCNC: 108 MMOL/L — SIGNIFICANT CHANGE UP (ref 96–108)
CO2 SERPL-SCNC: 30 MMOL/L — SIGNIFICANT CHANGE UP (ref 22–31)
COLOR SPEC: YELLOW — SIGNIFICANT CHANGE UP
CREAT SERPL-MCNC: 1.46 MG/DL — HIGH (ref 0.5–1.3)
DIFF PNL FLD: ABNORMAL
EPI CELLS # UR: ABNORMAL
GLUCOSE SERPL-MCNC: 147 MG/DL — HIGH (ref 70–99)
GLUCOSE UR QL: NEGATIVE MG/DL — SIGNIFICANT CHANGE UP
HCT VFR BLD CALC: 30.4 % — LOW (ref 39–50)
HGB BLD-MCNC: 9.5 G/DL — LOW (ref 13–17)
INR BLD: 4.71 RATIO — HIGH (ref 0.88–1.16)
KETONES UR-MCNC: NEGATIVE — SIGNIFICANT CHANGE UP
LEUKOCYTE ESTERASE UR-ACNC: NEGATIVE — SIGNIFICANT CHANGE UP
MAGNESIUM SERPL-MCNC: 1.9 MG/DL — SIGNIFICANT CHANGE UP (ref 1.6–2.6)
MCHC RBC-ENTMCNC: 31.1 GM/DL — LOW (ref 32–36)
MCHC RBC-ENTMCNC: 31.2 PG — SIGNIFICANT CHANGE UP (ref 27–34)
MCV RBC AUTO: 100.3 FL — HIGH (ref 80–100)
NITRITE UR-MCNC: NEGATIVE — SIGNIFICANT CHANGE UP
OB PNL STL: POSITIVE
PH UR: 5 — SIGNIFICANT CHANGE UP (ref 5–8)
PHOSPHATE SERPL-MCNC: 2.2 MG/DL — LOW (ref 2.5–4.5)
PLATELET # BLD AUTO: 281 K/UL — SIGNIFICANT CHANGE UP (ref 150–400)
POTASSIUM SERPL-MCNC: 4.5 MMOL/L — SIGNIFICANT CHANGE UP (ref 3.5–5.3)
POTASSIUM SERPL-SCNC: 4.5 MMOL/L — SIGNIFICANT CHANGE UP (ref 3.5–5.3)
PROT SERPL-MCNC: 5.7 GM/DL — LOW (ref 6–8.3)
PROT UR-MCNC: NEGATIVE MG/DL — SIGNIFICANT CHANGE UP
PROTHROM AB SERPL-ACNC: 53 SEC — HIGH (ref 9.8–12.7)
RBC # BLD: 3.03 M/UL — LOW (ref 4.2–5.8)
RBC # FLD: 14.1 % — SIGNIFICANT CHANGE UP (ref 11–15)
RBC CASTS # UR COMP ASSIST: ABNORMAL /HPF (ref 0–4)
SODIUM SERPL-SCNC: 142 MMOL/L — SIGNIFICANT CHANGE UP (ref 135–145)
SP GR SPEC: 1.01 — SIGNIFICANT CHANGE UP (ref 1.01–1.02)
UROBILINOGEN FLD QL: NEGATIVE MG/DL — SIGNIFICANT CHANGE UP
WBC # BLD: 13.3 K/UL — HIGH (ref 3.8–10.5)
WBC # FLD AUTO: 13.3 K/UL — HIGH (ref 3.8–10.5)
WBC UR QL: SIGNIFICANT CHANGE UP

## 2017-12-05 PROCEDURE — 71010: CPT | Mod: 26

## 2017-12-05 RX ADMIN — Medication 3 MILLIGRAM(S): at 22:46

## 2017-12-05 RX ADMIN — BRIMONIDINE TARTRATE 1 DROP(S): 2 SOLUTION/ DROPS OPHTHALMIC at 05:51

## 2017-12-05 RX ADMIN — SODIUM CHLORIDE 50 MILLILITER(S): 9 INJECTION, SOLUTION INTRAVENOUS at 05:50

## 2017-12-05 RX ADMIN — BRIMONIDINE TARTRATE 1 DROP(S): 2 SOLUTION/ DROPS OPHTHALMIC at 17:09

## 2017-12-05 RX ADMIN — Medication 81 MILLIGRAM(S): at 11:19

## 2017-12-05 RX ADMIN — QUETIAPINE FUMARATE 25 MILLIGRAM(S): 200 TABLET, FILM COATED ORAL at 05:51

## 2017-12-05 RX ADMIN — QUETIAPINE FUMARATE 25 MILLIGRAM(S): 200 TABLET, FILM COATED ORAL at 21:35

## 2017-12-05 RX ADMIN — QUETIAPINE FUMARATE 25 MILLIGRAM(S): 200 TABLET, FILM COATED ORAL at 13:18

## 2017-12-05 RX ADMIN — GABAPENTIN 300 MILLIGRAM(S): 400 CAPSULE ORAL at 11:19

## 2017-12-05 RX ADMIN — Medication 63.75 MILLIMOLE(S): at 10:23

## 2017-12-05 RX ADMIN — Medication 650 MILLIGRAM(S): at 23:00

## 2017-12-05 RX ADMIN — Medication 650 MILLIGRAM(S): at 23:30

## 2017-12-05 RX ADMIN — HEPARIN SODIUM 5000 UNIT(S): 5000 INJECTION INTRAVENOUS; SUBCUTANEOUS at 05:51

## 2017-12-05 NOTE — DISCHARGE NOTE ADULT - HOSPITAL COURSE
89 year old male PMH Afib, PPM, CHF, HTN, Peripheral neuropathy, CVA, GERD, BPH, Prostate cancer, hip fx, admitted with ischemic bowel 2/2 incarcerated R inguinal hernia s/p ex lap, BEATRIS, R inguinal hernia reduction. Patient was hemodynamically stable post-operatively and was moved from the ICU to the floors. Patient tolerated advancement of diet without nausea/vomiting and was seen by speech pathology. Medicine, cardiology, and nephrology were consulted. Physical therapy saw the patient as well.    At the time of discharge, the patient was hemodynamically stable, was tolerating PO diet, was voiding urine and passing stool, was seen by physical therapy, and was comfortable with adequate pain control. No nausea, vomiting, fever, chills. Patient instructed to follow up and to call the office to make an appointment. Patient instructed to call for any fever over 101, nausea, vomiting, severe pain, no passing of gas or bowel movement. Patient understood. 89 year old male PMH Afib, PPM, CHF, HTN, Peripheral neuropathy, CVA, GERD, BPH, Prostate cancer, hip fx, admitted with ischemic bowel 2/2 incarcerated R inguinal hernia s/p ex lap, BEATRIS, R inguinal hernia reduction. Patient was hemodynamically stable post-operatively and was moved from the ICU to the floors. Patient tolerated advancement of diet without nausea/vomiting and was seen by speech pathology. Medicine, cardiology, and nephrology were consulted. Physical therapy saw the patient as well.    At the time of discharge, the patient was hemodynamically stable, was tolerating PO diet, was voiding urine and passing stool, was seen by physical therapy who recommended subacute rehab, and was comfortable with adequate pain control. No nausea, vomiting, fever, chills. Patient instructed to follow up and to call the office to make an appointment. Patient instructed to call for any fever over 101, nausea, vomiting, severe pain, no passing of gas or bowel movement. Patient understood. Patient's INR was therapeutic at the time of the discharge.

## 2017-12-05 NOTE — DISCHARGE NOTE ADULT - PATIENT PORTAL LINK FT
“You can access the [employer] Patient Portal, offered by [facility], by registering with the following website: [email or web address]”

## 2017-12-05 NOTE — DISCHARGE NOTE ADULT - CARE PROVIDER_API CALL
Eunice Starks), Surgery  [address] [facility] [address] [place] [address]  Phone: [phone]  Fax: [phone]

## 2017-12-05 NOTE — PROGRESS NOTE ADULT - SUBJECTIVE AND OBJECTIVE BOX
Patient is a 89y old  Male who presents with a chief complaint of Coffee ground emesis, abdominal distention, and upper abdominal pain x 3 days (26 Nov 2017 01:47)  wBC elevated today (13.3). no fever. No dyspnea. S. Cr slight elevation  swallow eval normal    INTERVAL HPI/OVERNIGHT EVENTS: uneventful    MEDICATIONS  (STANDING):  aspirin  chewable 81 milliGRAM(s) Oral daily  brimonidine 0.2% Ophthalmic Solution 1 Drop(s) Both EYES two times a day  carvedilol 3.125 milliGRAM(s) Oral every 12 hours  dextrose 5% + sodium chloride 0.45%. 1000 milliLiter(s) (50 mL/Hr) IV Continuous <Continuous>  gabapentin 300 milliGRAM(s) Oral daily  heparin  Injectable 5000 Unit(s) SubCutaneous every 12 hours  QUEtiapine 25 milliGRAM(s) Oral three times a day    MEDICATIONS  (PRN):  acetaminophen   Tablet. 650 milliGRAM(s) Oral every 6 hours PRN Mild Pain (1 - 3)  benzocaine 15 mG/menthol 3.6 mG Lozenge 1 Lozenge Oral every 4 hours PRN Sore Throat  bisacodyl Suppository 10 milliGRAM(s) Rectal daily PRN Constipation  fentaNYL    Injectable 50 MICROGram(s) IV Push every 6 hours PRN Moderate Pain (4 - 6)  melatonin 3 milliGRAM(s) Oral at bedtime PRN Insomnia      Allergies    No Known Allergies    Intolerances        REVIEW OF SYSTEMS:  CONSTITUTIONAL: No fever, weight loss, or fatigue  EYES: No eye pain, visual disturbances, or discharge  ENMT:  No difficulty hearing, tinnitus, vertigo; No sinus or throat pain  NECK: No pain or stiffness  BREASTS: No pain, masses, or nipple discharge  RESPIRATORY: No cough, wheezing, chills or hemoptysis; No shortness of breath  CARDIOVASCULAR: No chest pain, palpitations, dizziness, or leg swelling  GASTROINTESTINAL: No abdominal or epigastric pain. No nausea, vomiting, or hematemesis; No diarrhea or constipation. No melena or hematochezia.  GENITOURINARY: No dysuria, frequency, hematuria, or incontinence  NEUROLOGICAL: No headaches, memory loss, loss of strength, numbness, or tremors  SKIN: No itching, burning, rashes, or lesions   LYMPH NODES: No enlarged glands  ENDOCRINE: No heat or cold intolerance; No hair loss  MUSCULOSKELETAL: No joint pain or swelling; No muscle, back, or extremity pain  PSYCHIATRIC: No depression, anxiety, mood swings, or difficulty sleeping  HEME/LYMPH: No easy bruising, or bleeding gums  ALLERY AND IMMUNOLOGIC: No hives or eczema    Vital Signs Last 24 Hrs  T(C): 36.3 (05 Dec 2017 06:00), Max: 36.6 (04 Dec 2017 12:19)  T(F): 97.4 (05 Dec 2017 06:00), Max: 97.9 (04 Dec 2017 12:19)  HR: 70 (05 Dec 2017 11:01) (57 - 70)  BP: 140/60 (05 Dec 2017 11:01) (109/69 - 144/68)  BP(mean): --  RR: 20 (05 Dec 2017 11:01) (17 - 20)  SpO2: 98% (05 Dec 2017 11:01) (98% - 100%)    PHYSICAL EXAM:  GENERAL: NAD, well-groomed, well-developed  HEAD:  Atraumatic, Normocephalic  EYES: EOMI, PERRLA, conjunctiva and sclera clear  ENMT: No tonsillar erythema, exudates, or enlargement; Moist mucous membranes, Good dentition, No lesions  NECK: Supple, No JVD, Normal thyroid  NERVOUS SYSTEM:  Alert & Oriented X3, Good concentration; Motor Strength 5/5 B/L upper and lower extremities; DTRs 2+ intact and symmetric  CHEST/LUNG: Clear to percussion bilaterally; No rales, rhonchi, wheezing, or rubs  HEART: Regular rate and rhythm; No murmurs, rubs, or gallops  ABDOMEN: Soft, Nontender, Nondistended; Bowel sounds present  EXTREMITIES:  2+ Peripheral Pulses, No clubbing, cyanosis, or edema  LYMPH: No lymphadenopathy noted  SKIN: No rashes or lesions    LABS:                        9.5    13.3  )-----------( 281      ( 05 Dec 2017 07:16 )             30.4     12-05    142  |  108  |  23  ----------------------------<  147<H>  4.5   |  30  |  1.46<H>    Ca    7.8<L>      05 Dec 2017 07:16  Phos  2.2     12-05  Mg     1.9     12-05    TPro  5.7<L>  /  Alb  2.3<L>  /  TBili  0.7  /  DBili  x   /  AST  20  /  ALT  25  /  AlkPhos  77  12-05      PT/INR - ( 05 Dec 2017 07:16 )   PT: 53.0 sec;   INR: 4.71 ratio             CAPILLARY BLOOD GLUCOSE                    RADIOLOGY & ADDITIONAL TESTS:    Imaging Personally Reviewed:  [ ] YES  [ ] NO    Consultant(s) Notes Reviewed:  [ ] YES  [ ] NO    Care Discussed with Consultants/Other Providers [ ] YES  [ ] NO    Care discussed with family,         [  ]   yes  [  ]  No    imp:    stable[ ]    unstable[  ]     improving [ x  ]       unchanged  [  ]                Plans:  Continue present plans  [x  ] as pwer surgery. supplement phosphate. monitor labs. encourage Po fluids.               New consult [  ]   specialty  .......               order test[  ]    test name.                  Discharge Planning  [  ]

## 2017-12-05 NOTE — CHART NOTE - NSCHARTNOTEFT_GEN_A_CORE
Assessment:   89 y.o male c ischemic bowel 2/2 inguinal hernia reduction POD # 9; reports abdominal pain present  Per swallow eval (12/4) cleared for Dysphagia 2-soft/thin liquids    Factors impacting intake: [ ] none [ ] nausea  [ ] vomiting [ ] diarrhea [ ] constipation  [ ]chewing problems [ ] swallowing issues  [X ] other: doesn't feel well enough to eat    Diet Presciption: Diet, Dysphagia 3 Soft-Thin Liquids:   DASH/TLC {Sodium & Cholesterol Restricted} (12-04-17 @ 14:36)    Intake: , 25% intake today (first day on solid diet); total feed    Current Weight: 103.6 kg (12/5); previous wt 100.9 kg (11/26)  % Weight Change: + 3% x 9 days    Pertinent Medications: MEDICATIONS  (STANDING):  aspirin  chewable 81 milliGRAM(s) Oral daily  brimonidine 0.2% Ophthalmic Solution 1 Drop(s) Both EYES two times a day  carvedilol 3.125 milliGRAM(s) Oral every 12 hours  dextrose 5% + sodium chloride 0.45%. 1000 milliLiter(s) (50 mL/Hr) IV Continuous <Continuous>  gabapentin 300 milliGRAM(s) Oral daily  QUEtiapine 25 milliGRAM(s) Oral three times a day    MEDICATIONS  (PRN):  acetaminophen   Tablet. 650 milliGRAM(s) Oral every 6 hours PRN Mild Pain (1 - 3)  benzocaine 15 mG/menthol 3.6 mG Lozenge 1 Lozenge Oral every 4 hours PRN Sore Throat  bisacodyl Suppository 10 milliGRAM(s) Rectal daily PRN Constipation  fentaNYL    Injectable 50 MICROGram(s) IV Push every 6 hours PRN Moderate Pain (4 - 6)  melatonin 3 milliGRAM(s) Oral at bedtime PRN Insomnia    Pertinent Labs: 12-05 Na142 mmol/L Glu 147 mg/dL<H> K+ 4.5 mmol/L Cr  1.46 mg/dL<H> BUN 23 mg/dL Phos 2.2 mg/dL<L> Alb 2.3 g/dL<L> PAB n/a        CAPILLARY BLOOD GLUCOSE        Skin: WDL; 1+ edema noted b/l feet  BM: regular; x 1 today (12/5)    Estimated Needs:   [X ] no change since previous assessment  [ ] recalculated:     Previous Nutrition Diagnosis:   [X ] Inadequate Energy Intake [ ]Inadequate Oral Intake [ ] Excessive Energy Intake   [ ] Underweight [ ] Increased Nutrient Needs [ ] Overweight/Obesity   [ ] Altered GI Function [ ] Unintended Weight Loss [ ] Food & Nutrition Related Knowledge Deficit [ ] Malnutrition     Nutrition Diagnosis is [X ] ongoing  [ ] resolved [ ] not applicable     New Nutrition Diagnosis: [X ] not applicable       Interventions: continue diet rx as noted above +  Recommend  [ ] Change Diet To:  [X ] Nutrition Supplement: Ensure Enlive x 3/day (provides 1050 kcal, 60 g protein)   [ ] Nutrition Support  [ ] Other:     Monitoring and Evaluation:   [X ] PO intake [ x ] Tolerance to diet prescription [ x ] weights [ x ] labs[ x ] follow up per protocol  [ ] other:

## 2017-12-05 NOTE — PROGRESS NOTE ADULT - SUBJECTIVE AND OBJECTIVE BOX
Surgery NP note    Patient seen and examined bedside   No new complaints offered.   incisional Abdominal pain, Denies nausea and vomiting. Tolerating dysphasia diet.   flatus/BM.       T(F): 98 (12-05-17 @ 11:51), Max: 98 (12-05-17 @ 11:51)  HR: 61 (12-05-17 @ 11:51) (57 - 70)  BP: 116/51 (12-05-17 @ 11:51) (109/69 - 144/68)  RR: 19 (12-05-17 @ 11:51) (17 - 20)  SpO2: 100% (12-05-17 @ 11:51) (98% - 100%)  Wt(kg): --  CAPILLARY BLOOD GLUCOSE    PHYSICAL EXAM:  General: NAD.   Neuro:  Alert & responsive forgetful  HEENT: NCAT, EOMI, conjunctiva clear  CV: +S1+S2 regular rate and rhythm  Lung: clear to ausculation bilaterally, respirations nonlabored, good inspiratory effort  Abdomen:+ Bowel sounds, midline dressing c/d/i. Removed, midline incision with intermittent staples c/d/i. No signs of infection. Soft, nondistended, nontender.   Extremities: no pedal edema or calf tenderness noted   : No CVA or SP tenderness    LABS:                        9.5    13.3  )-----------( 281      ( 05 Dec 2017 07:16 )             30.4     12-05    142  |  108  |  23  ----------------------------<  147<H>  4.5   |  30  |  1.46<H>  Ca    7.8<L>      05 Dec 2017 07:16  Phos  2.2     12-05  Mg     1.9     12-05    TPro  5.7<L>  /  Alb  2.3<L>  /  TBili  0.7  /  DBili  x   /  AST  20  /  ALT  25  /  AlkPhos  77  12-05    PT/INR - ( 05 Dec 2017 07:16 )   PT: 53.0 sec;   INR: 4.71 ratio           I&O's Detail    04 Dec 2017 07:01  -  05 Dec 2017 07:00  --------------------------------------------------------  IN:    dextrose 5% + sodium chloride 0.45%.: 1600 mL    Oral Fluid: 570 mL    Solution: 250 mL  Total IN: 2420 mL    OUT:  Total OUT: 0 mL    Total NET: 2420 mL      Impression: 89 year old male  admitted with ischemic bowel 2/2 incarcerated R inguinal hernia POD #9 s/p ex lap, BEATRIS, R inguinal hernia reduction, restarted on coumadin for AFIB now on hold for 3days, leukocytosis, hypophosphatemia  Plan:  - speech and swallow evaluation appreciated, continue dysphasia diet  - f/u AM labs, CXR and UA  - local wound care per surgical team  - DVT ppx, pain management PRN, incentive spirometer  - Discharge planning, will d/w Dr. Starks Surgery NP note    Patient seen and examined bedside   No new complaints offered.   incisional Abdominal pain, Denies nausea and vomiting. Tolerating dysphasia diet.   flatus/BM.       T(F): 98 (12-05-17 @ 11:51), Max: 98 (12-05-17 @ 11:51)  HR: 61 (12-05-17 @ 11:51) (57 - 70)  BP: 116/51 (12-05-17 @ 11:51) (109/69 - 144/68)  RR: 19 (12-05-17 @ 11:51) (17 - 20)  SpO2: 100% (12-05-17 @ 11:51) (98% - 100%)  Wt(kg): --  CAPILLARY BLOOD GLUCOSE    PHYSICAL EXAM:  General: NAD.   Neuro:  Alert & responsive forgetful  HEENT: NCAT, EOMI, conjunctiva clear  CV: +S1+S2 regular rate and rhythm  Lung: clear to ausculation bilaterally, respirations nonlabored, good inspiratory effort  Abdomen:+ Bowel sounds, midline dressing c/d/i. Removed, midline incision with intermittent staples c/d/i. No signs of infection. Soft, nondistended, nontender.   Extremities: no pedal edema or calf tenderness noted   : No CVA or SP tenderness    LABS:                        9.5    13.3  )-----------( 281      ( 05 Dec 2017 07:16 )             30.4     12-05    142  |  108  |  23  ----------------------------<  147<H>  4.5   |  30  |  1.46<H>  Ca    7.8<L>      05 Dec 2017 07:16  Phos  2.2     12-05  Mg     1.9     12-05    TPro  5.7<L>  /  Alb  2.3<L>  /  TBili  0.7  /  DBili  x   /  AST  20  /  ALT  25  /  AlkPhos  77  12-05    PT/INR - ( 05 Dec 2017 07:16 )   PT: 53.0 sec;   INR: 4.71 ratio           I&O's Detail    04 Dec 2017 07:01  -  05 Dec 2017 07:00  --------------------------------------------------------  IN:    dextrose 5% + sodium chloride 0.45%.: 1600 mL    Oral Fluid: 570 mL    Solution: 250 mL  Total IN: 2420 mL    OUT:  Total OUT: 0 mL    Total NET: 2420 mL      Impression: 89 year old male  admitted with ischemic bowel 2/2 incarcerated R inguinal hernia POD #9 s/p ex lap, BEATRIS, R inguinal hernia reduction, restarted on coumadin for AFIB now on hold for 3days, leukocytosis, hypophosphatemia  Plan:  - speech and swallow evaluation appreciated, continue dysphasia diet  - f/u AM labs, CXR and UA  - local wound care per surgical team  - D/C heparin sq  - pain management PRN, incentive spirometer  - Discharge planning, will d/w Dr. Starks

## 2017-12-05 NOTE — DISCHARGE NOTE ADULT - CONDITION (STATED IN TERMS THAT PERMIT A SPECIFIC MEASURABLE COMPARISON WITH CONDITION ON ADMISSION):
At the time of discharge, the patient was hemodynamically stable, was tolerating PO diet, was voiding urine and passing stool, and was comfortable with adequate pain control.

## 2017-12-05 NOTE — DISCHARGE NOTE ADULT - MEDICATION SUMMARY - MEDICATIONS TO TAKE
I will START or STAY ON the medications listed below when I get home from the hospital:    spironolactone 25 mg oral tablet  -- 1 tab(s) by mouth once a day  -- Indication: For CHF (congestive heart failure)    aspirin 81 mg oral tablet, chewable  -- 1 tab(s) by mouth once a day  -- Indication: For CAD    tamsulosin 0.4 mg oral capsule  -- 1 cap(s) by mouth once a day  -- Indication: For BPH (benign prostatic hyperplasia)    Coumadin 1 mg oral tablet  -- 1 tab(s) by mouth once a day, please only resume based on repeat INR planned for 12/14  -- Indication: For Afib    gabapentin 100 mg oral capsule  -- 3 cap(s) by mouth 3 times a day  -- Indication: For Peripheral neuropathy    sertraline 25 mg oral tablet  -- 1 tab(s) by mouth once a day  -- Indication: For Dementia    QUEtiapine 25 mg oral tablet  -- 1 tab(s) by mouth 3 times a day  -- Indication: For Dementia    risperiDONE 1 mg oral tablet  -- 1 tab(s) by mouth once a day  -- Indication: For Dementia    carvedilol 3.125 mg oral tablet  -- 1 tab(s) by mouth every 12 hours  -- Indication: For CHF (congestive heart failure)    Lasix 20 mg oral tablet  -- 1 tab(s) by mouth once a day  -- Indication: For CHF (congestive heart failure)    senna oral tablet  -- 2 tab(s) by mouth once a day (at bedtime)  -- Indication: For Constipation    docusate sodium 100 mg oral capsule  -- 1 cap(s) by mouth 3 times a day  -- Indication: For Constipation    simethicone 80 mg oral tablet, chewable  -- 1 tab(s) by mouth every 8 hours, As needed, Gas  -- Indication: For Dyspepsia    melatonin 3 mg oral tablet  -- 1 tab(s) by mouth once a day (at bedtime), As needed, Insomnia  -- Indication: For Insomnia    latanoprost 0.005% ophthalmic solution  -- 1 drop(s) to each affected eye once a day (at bedtime)  -- Indication: For Eye drop    timolol 0.5% ophthalmic solution  -- 1 drop(s) to each affected eye 2 times a day  -- Indication: For Eye drop    brimonidine 0.2% ophthalmic solution  -- 1 drop(s) to each affected eye 2 times a day  -- Indication: For Eye Drop    pantoprazole 40 mg oral delayed release tablet  -- 1 tab(s) by mouth once a day (before a meal)  -- Indication: For GERD (gastroesophageal reflux disease)

## 2017-12-05 NOTE — DISCHARGE NOTE ADULT - MEDICATION SUMMARY - MEDICATIONS TO STOP TAKING
I will STOP taking the medications listed below when I get home from the hospital:    Coumadin 2.5 mg oral tablet  -- 1 tab(s) by mouth once a day (at bedtime) - home    warfarin 3 mg oral tablet  -- 1 tab(s) by mouth once a day

## 2017-12-05 NOTE — DISCHARGE NOTE ADULT - PLAN OF CARE
Post-op pain control Follow up in 7-10 days with Dr. Starks. Please call the office to make an appointment. Activity as tolerated. Rest as needed. You may eat a Dysphagia 3 soft, thin liquid diet. Do not lift anything heavier than 10 pounds. You may take motrin or advil for mild pain as needed, in addition to prescribed narcotic medication. Do not drive while taking narcotic pain medication. You may take over the counter stool softeners as needed. Call for any fever over 101, nausea, vomiting, severe pain, no passing of gas or bowel movement. You may shower and pat dry abdomen. Maintain INR between 2-3 Please hold Coumadin for the next two days and repeat INR in 2 days. Then you may resume Coumadin at 1mg if INR subtherapeutic

## 2017-12-05 NOTE — DISCHARGE NOTE ADULT - WARFARIN/COUMADIN - FOLLOW - UP MONITORING
left upper thigh, anterior firm central area 1x4 cm with circumferential erythema, soft tender area consistent with cellulitis approximately 20cm long x  10cm wide, demarcated Statement Selected

## 2017-12-05 NOTE — DISCHARGE NOTE ADULT - CARE PLAN
Principal Discharge DX:	Ischemic bowel disease  Goal:	Post-op pain control  Instructions for follow-up, activity and diet:	Follow up in 7-10 days with Dr. Starks. Please call the office to make an appointment. Activity as tolerated. Rest as needed. You may eat a Dysphagia 3 soft, thin liquid diet. Do not lift anything heavier than 10 pounds. You may take motrin or advil for mild pain as needed, in addition to prescribed narcotic medication. Do not drive while taking narcotic pain medication. You may take over the counter stool softeners as needed. Call for any fever over 101, nausea, vomiting, severe pain, no passing of gas or bowel movement. You may shower and pat dry abdomen. Principal Discharge DX:	Ischemic bowel disease  Goal:	Post-op pain control  Instructions for follow-up, activity and diet:	Follow up in 7-10 days with Dr. Starks. Please call the office to make an appointment. Activity as tolerated. Rest as needed. You may eat a Dysphagia 3 soft, thin liquid diet. Do not lift anything heavier than 10 pounds. You may take motrin or advil for mild pain as needed, in addition to prescribed narcotic medication. Do not drive while taking narcotic pain medication. You may take over the counter stool softeners as needed. Call for any fever over 101, nausea, vomiting, severe pain, no passing of gas or bowel movement. You may shower and pat dry abdomen.  Secondary Diagnosis:	Atrial fibrillation, unspecified type  Goal:	Maintain INR between 2-3  Instructions for follow-up, activity and diet:	Please hold Coumadin for the next two days and repeat INR in 2 days. Then you may resume Coumadin at 1mg if INR subtherapeutic

## 2017-12-06 LAB
ANION GAP SERPL CALC-SCNC: 9 MMOL/L — SIGNIFICANT CHANGE UP (ref 5–17)
BUN SERPL-MCNC: 31 MG/DL — HIGH (ref 7–23)
CALCIUM SERPL-MCNC: 7.8 MG/DL — LOW (ref 8.5–10.1)
CHLORIDE SERPL-SCNC: 106 MMOL/L — SIGNIFICANT CHANGE UP (ref 96–108)
CO2 SERPL-SCNC: 25 MMOL/L — SIGNIFICANT CHANGE UP (ref 22–31)
CREAT SERPL-MCNC: 1.34 MG/DL — HIGH (ref 0.5–1.3)
GLUCOSE SERPL-MCNC: 129 MG/DL — HIGH (ref 70–99)
HCT VFR BLD CALC: 29.8 % — LOW (ref 39–50)
HGB BLD-MCNC: 9.4 G/DL — LOW (ref 13–17)
INR BLD: 4.27 RATIO — HIGH (ref 0.88–1.16)
MAGNESIUM SERPL-MCNC: 2.1 MG/DL — SIGNIFICANT CHANGE UP (ref 1.6–2.6)
MCHC RBC-ENTMCNC: 31.6 GM/DL — LOW (ref 32–36)
MCHC RBC-ENTMCNC: 31.9 PG — SIGNIFICANT CHANGE UP (ref 27–34)
MCV RBC AUTO: 100.9 FL — HIGH (ref 80–100)
PHOSPHATE SERPL-MCNC: 1.8 MG/DL — LOW (ref 2.5–4.5)
PLATELET # BLD AUTO: 242 K/UL — SIGNIFICANT CHANGE UP (ref 150–400)
POTASSIUM SERPL-MCNC: 3.9 MMOL/L — SIGNIFICANT CHANGE UP (ref 3.5–5.3)
POTASSIUM SERPL-SCNC: 3.9 MMOL/L — SIGNIFICANT CHANGE UP (ref 3.5–5.3)
PROTHROM AB SERPL-ACNC: 47.9 SEC — HIGH (ref 9.8–12.7)
RBC # BLD: 2.95 M/UL — LOW (ref 4.2–5.8)
RBC # FLD: 13.8 % — SIGNIFICANT CHANGE UP (ref 11–15)
SODIUM SERPL-SCNC: 140 MMOL/L — SIGNIFICANT CHANGE UP (ref 135–145)
WBC # BLD: 10.1 K/UL — SIGNIFICANT CHANGE UP (ref 3.8–10.5)
WBC # FLD AUTO: 10.1 K/UL — SIGNIFICANT CHANGE UP (ref 3.8–10.5)

## 2017-12-06 RX ORDER — SODIUM,POTASSIUM PHOSPHATES 278-250MG
1 POWDER IN PACKET (EA) ORAL
Qty: 0 | Refills: 0 | Status: COMPLETED | OUTPATIENT
Start: 2017-12-06 | End: 2017-12-06

## 2017-12-06 RX ORDER — POTASSIUM PHOSPHATE, MONOBASIC POTASSIUM PHOSPHATE, DIBASIC 236; 224 MG/ML; MG/ML
30 INJECTION, SOLUTION INTRAVENOUS ONCE
Qty: 0 | Refills: 0 | Status: DISCONTINUED | OUTPATIENT
Start: 2017-12-06 | End: 2017-12-06

## 2017-12-06 RX ORDER — MORPHINE SULFATE 50 MG/1
2 CAPSULE, EXTENDED RELEASE ORAL EVERY 6 HOURS
Qty: 0 | Refills: 0 | Status: DISCONTINUED | OUTPATIENT
Start: 2017-12-06 | End: 2017-12-07

## 2017-12-06 RX ORDER — POTASSIUM PHOSPHATE, MONOBASIC POTASSIUM PHOSPHATE, DIBASIC 236; 224 MG/ML; MG/ML
15 INJECTION, SOLUTION INTRAVENOUS ONCE
Qty: 0 | Refills: 0 | Status: COMPLETED | OUTPATIENT
Start: 2017-12-06 | End: 2017-12-06

## 2017-12-06 RX ADMIN — Medication 650 MILLIGRAM(S): at 05:51

## 2017-12-06 RX ADMIN — QUETIAPINE FUMARATE 25 MILLIGRAM(S): 200 TABLET, FILM COATED ORAL at 05:12

## 2017-12-06 RX ADMIN — Medication 3 MILLIGRAM(S): at 21:15

## 2017-12-06 RX ADMIN — Medication 650 MILLIGRAM(S): at 05:11

## 2017-12-06 RX ADMIN — Medication 1 TABLET(S): at 12:20

## 2017-12-06 RX ADMIN — BRIMONIDINE TARTRATE 1 DROP(S): 2 SOLUTION/ DROPS OPHTHALMIC at 05:12

## 2017-12-06 RX ADMIN — CARVEDILOL PHOSPHATE 3.12 MILLIGRAM(S): 80 CAPSULE, EXTENDED RELEASE ORAL at 05:12

## 2017-12-06 RX ADMIN — GABAPENTIN 300 MILLIGRAM(S): 400 CAPSULE ORAL at 12:13

## 2017-12-06 RX ADMIN — QUETIAPINE FUMARATE 25 MILLIGRAM(S): 200 TABLET, FILM COATED ORAL at 21:15

## 2017-12-06 RX ADMIN — BRIMONIDINE TARTRATE 1 DROP(S): 2 SOLUTION/ DROPS OPHTHALMIC at 17:33

## 2017-12-06 RX ADMIN — POTASSIUM PHOSPHATE, MONOBASIC POTASSIUM PHOSPHATE, DIBASIC 63.75 MILLIMOLE(S): 236; 224 INJECTION, SOLUTION INTRAVENOUS at 12:13

## 2017-12-06 RX ADMIN — Medication 1 TABLET(S): at 17:32

## 2017-12-06 RX ADMIN — Medication 81 MILLIGRAM(S): at 12:13

## 2017-12-06 RX ADMIN — QUETIAPINE FUMARATE 25 MILLIGRAM(S): 200 TABLET, FILM COATED ORAL at 14:19

## 2017-12-06 NOTE — CHART NOTE - NSCHARTNOTEFT_GEN_A_CORE
Patient seen and examined at bedside with Dr. Starks.  Comfortable.  Tolerating diet.     Lungs CTA b/l, no w/r/r  Abdomen soft. Midline incision with intermittent staples c/d/i. +BS. Nondistended. Nontender.    Patient surgically stable.  Abdominal binder d/cd.   Will need outpatient follow up for staple removal.  OOB and increase activity with PT.   Continue medical follow up.

## 2017-12-06 NOTE — PROGRESS NOTE ADULT - SUBJECTIVE AND OBJECTIVE BOX
SURGERY PROGRESS HPI:  Pt seen and examined at bedside. Denies pain and complaints. Pt tolerating diet although states he doesn't eat too much. Pt denies nausea and vomiting. Normal BM/flatus. +BM overnight per RN. Voiding well. Pt denies chest pain, SOB, dizziness, fever, chills.       Vital Signs Last 24 Hrs  T(C): 36.8 (06 Dec 2017 00:10), Max: 37.1 (05 Dec 2017 17:00)  T(F): 98.2 (06 Dec 2017 00:10), Max: 98.8 (05 Dec 2017 17:00)  HR: 60 (06 Dec 2017 00:10) (60 - 89)  BP: 127/60 (06 Dec 2017 00:10) (109/69 - 143/66)  BP(mean): --  RR: 16 (06 Dec 2017 00:10) (16 - 20)  SpO2: 94% (06 Dec 2017 00:10) (94% - 100%)      PHYSICAL EXAM:    GENERAL: NAD  HEAD:  Atraumatic, Normocephalic  CHEST/LUNG: Clear to ausculation, bilaterally   HEART: RRR S1S2  ABDOMEN: Abdominal binder in place. Midline incision with staples healing well. non distended, +BS, soft, non tender  EXTREMITIES:  calf soft, non tender b/l    I&O's Detail    04 Dec 2017 07:01  -  05 Dec 2017 07:00  --------------------------------------------------------  IN:    dextrose 5% + sodium chloride 0.45%.: 1600 mL    Oral Fluid: 570 mL    Solution: 250 mL  Total IN: 2420 mL    OUT:  Total OUT: 0 mL    Total NET: 2420 mL      05 Dec 2017 07:01  -  06 Dec 2017 05:15  --------------------------------------------------------  IN:    dextrose 5% + sodium chloride 0.45%.: 500 mL    Oral Fluid: 440 mL    Solution: 250 mL  Total IN: 1190 mL    OUT:  Total OUT: 0 mL    Total NET: 1190 mL          LABS:                        9.5    13.3  )-----------( 281      ( 05 Dec 2017 07:16 )             30.4     12    142  |  108  |  23  ----------------------------<  147<H>  4.5   |  30  |  1.46<H>    Ca    7.8<L>      05 Dec 2017 07:16  Phos  2.2       Mg     1.9         TPro  5.7<L>  /  Alb  2.3<L>  /  TBili  0.7  /  DBili  x   /  AST  20  /  ALT  25  /  AlkPhos  77  12    PT/INR - ( 05 Dec 2017 07:16 )   PT: 53.0 sec;   INR: 4.71 ratio           Urinalysis Basic - ( 05 Dec 2017 15:41 )    Color: Yellow / Appearance: Clear / S.015 / pH: x  Gluc: x / Ketone: Negative  / Bili: Negative / Urobili: Negative mg/dL   Blood: x / Protein: Negative mg/dL / Nitrite: Negative   Leuk Esterase: Negative / RBC: 6-10 /HPF / WBC 0-2   Sq Epi: x / Non Sq Epi: Moderate / Bacteria: Moderate        Impression: 89 year old male  admitted with ischemic bowel 2/2 incarcerated R inguinal hernia POD #10 s/p ex lap, BEATRIS, R inguinal hernia reduction, restarted on coumadin for AFIB now on hold for 3days    Plan:  - continue dysphasia diet  - f/u AM labs, WBC  - local wound care per surgical team  - pain management PRN, incentive spirometer  - Discharge planning  - will d/w Dr. Starks

## 2017-12-07 LAB
ANION GAP SERPL CALC-SCNC: 7 MMOL/L — SIGNIFICANT CHANGE UP (ref 5–17)
BUN SERPL-MCNC: 25 MG/DL — HIGH (ref 7–23)
CALCIUM SERPL-MCNC: 7.7 MG/DL — LOW (ref 8.5–10.1)
CHLORIDE SERPL-SCNC: 108 MMOL/L — SIGNIFICANT CHANGE UP (ref 96–108)
CO2 SERPL-SCNC: 28 MMOL/L — SIGNIFICANT CHANGE UP (ref 22–31)
CREAT SERPL-MCNC: 1.29 MG/DL — SIGNIFICANT CHANGE UP (ref 0.5–1.3)
GLUCOSE SERPL-MCNC: 125 MG/DL — HIGH (ref 70–99)
HCT VFR BLD CALC: 28.1 % — LOW (ref 39–50)
HGB BLD-MCNC: 8.8 G/DL — LOW (ref 13–17)
INR BLD: 4.14 RATIO — HIGH (ref 0.88–1.16)
MAGNESIUM SERPL-MCNC: 1.9 MG/DL — SIGNIFICANT CHANGE UP (ref 1.6–2.6)
MCHC RBC-ENTMCNC: 31.2 PG — SIGNIFICANT CHANGE UP (ref 27–34)
MCHC RBC-ENTMCNC: 31.3 GM/DL — LOW (ref 32–36)
MCV RBC AUTO: 99.5 FL — SIGNIFICANT CHANGE UP (ref 80–100)
PHOSPHATE SERPL-MCNC: 2.4 MG/DL — LOW (ref 2.5–4.5)
PLATELET # BLD AUTO: 272 K/UL — SIGNIFICANT CHANGE UP (ref 150–400)
POTASSIUM SERPL-MCNC: 4 MMOL/L — SIGNIFICANT CHANGE UP (ref 3.5–5.3)
POTASSIUM SERPL-SCNC: 4 MMOL/L — SIGNIFICANT CHANGE UP (ref 3.5–5.3)
PROTHROM AB SERPL-ACNC: 46.5 SEC — HIGH (ref 9.8–12.7)
RBC # BLD: 2.82 M/UL — LOW (ref 4.2–5.8)
RBC # FLD: 14 % — SIGNIFICANT CHANGE UP (ref 11–15)
SODIUM SERPL-SCNC: 143 MMOL/L — SIGNIFICANT CHANGE UP (ref 135–145)
WBC # BLD: 10 K/UL — SIGNIFICANT CHANGE UP (ref 3.8–10.5)
WBC # FLD AUTO: 10 K/UL — SIGNIFICANT CHANGE UP (ref 3.8–10.5)

## 2017-12-07 PROCEDURE — 99497 ADVNCD CARE PLAN 30 MIN: CPT

## 2017-12-07 RX ORDER — RISPERIDONE 4 MG/1
1 TABLET ORAL DAILY
Qty: 0 | Refills: 0 | Status: DISCONTINUED | OUTPATIENT
Start: 2017-12-07 | End: 2017-12-12

## 2017-12-07 RX ORDER — OXYCODONE AND ACETAMINOPHEN 5; 325 MG/1; MG/1
1 TABLET ORAL EVERY 6 HOURS
Qty: 0 | Refills: 0 | Status: DISCONTINUED | OUTPATIENT
Start: 2017-12-07 | End: 2017-12-12

## 2017-12-07 RX ORDER — POTASSIUM PHOSPHATE, MONOBASIC POTASSIUM PHOSPHATE, DIBASIC 236; 224 MG/ML; MG/ML
15 INJECTION, SOLUTION INTRAVENOUS ONCE
Qty: 0 | Refills: 0 | Status: COMPLETED | OUTPATIENT
Start: 2017-12-07 | End: 2017-12-07

## 2017-12-07 RX ADMIN — RISPERIDONE 1 MILLIGRAM(S): 4 TABLET ORAL at 15:11

## 2017-12-07 RX ADMIN — Medication 81 MILLIGRAM(S): at 12:10

## 2017-12-07 RX ADMIN — QUETIAPINE FUMARATE 25 MILLIGRAM(S): 200 TABLET, FILM COATED ORAL at 05:18

## 2017-12-07 RX ADMIN — BRIMONIDINE TARTRATE 1 DROP(S): 2 SOLUTION/ DROPS OPHTHALMIC at 05:19

## 2017-12-07 RX ADMIN — Medication 3 MILLIGRAM(S): at 22:18

## 2017-12-07 RX ADMIN — GABAPENTIN 300 MILLIGRAM(S): 400 CAPSULE ORAL at 12:10

## 2017-12-07 RX ADMIN — QUETIAPINE FUMARATE 25 MILLIGRAM(S): 200 TABLET, FILM COATED ORAL at 22:18

## 2017-12-07 RX ADMIN — OXYCODONE AND ACETAMINOPHEN 1 TABLET(S): 5; 325 TABLET ORAL at 12:10

## 2017-12-07 RX ADMIN — BRIMONIDINE TARTRATE 1 DROP(S): 2 SOLUTION/ DROPS OPHTHALMIC at 17:46

## 2017-12-07 RX ADMIN — CARVEDILOL PHOSPHATE 3.12 MILLIGRAM(S): 80 CAPSULE, EXTENDED RELEASE ORAL at 17:46

## 2017-12-07 RX ADMIN — POTASSIUM PHOSPHATE, MONOBASIC POTASSIUM PHOSPHATE, DIBASIC 63.75 MILLIMOLE(S): 236; 224 INJECTION, SOLUTION INTRAVENOUS at 11:22

## 2017-12-07 RX ADMIN — QUETIAPINE FUMARATE 25 MILLIGRAM(S): 200 TABLET, FILM COATED ORAL at 13:53

## 2017-12-07 RX ADMIN — OXYCODONE AND ACETAMINOPHEN 1 TABLET(S): 5; 325 TABLET ORAL at 13:12

## 2017-12-07 NOTE — PROGRESS NOTE ADULT - SUBJECTIVE AND OBJECTIVE BOX
SURGERY PROGRESS HPI:  Pt seen and examined at bedside. Denies pain and complaints. Pt tolerating diet. Pt denies nausea and vomiting. Normal BM/flatus. Voiding well. Pt denies chest pain, SOB, dizziness, fever, chills.      Vital Signs Last 24 Hrs  T(C): 37.1 (07 Dec 2017 00:30), Max: 37.1 (06 Dec 2017 17:30)  T(F): 98.8 (07 Dec 2017 00:30), Max: 98.8 (06 Dec 2017 17:30)  HR: 60 (07 Dec 2017 00:30) (60 - 60)  BP: 139/56 (07 Dec 2017 00:30) (120/67 - 139/56)  BP(mean): --  RR: 17 (07 Dec 2017 00:30) (17 - 20)  SpO2: 100% (07 Dec 2017 00:30) (99% - 100%)      PHYSICAL EXAM:    GENERAL: NAD  HEAD:  Atraumatic, Normocephalic  CHEST/LUNG: Clear to ausculation, bilaterally   HEART: RRR S1S2  ABDOMEN: Midline incision with staples healing well. non distended, +BS, soft, non tender  EXTREMITIES:  calf soft, non tender b/l    I&O's Detail    05 Dec 2017 07:01  -  06 Dec 2017 07:00  --------------------------------------------------------  IN:    dextrose 5% + sodium chloride 0.45%.: 1100 mL    Oral Fluid: 440 mL    Solution: 250 mL  Total IN: 1790 mL    OUT:  Total OUT: 0 mL    Total NET: 1790 mL      06 Dec 2017 07:01  -  07 Dec 2017 05:05  --------------------------------------------------------  IN:    Oral Fluid: 120 mL  Total IN: 120 mL    OUT:  Total OUT: 0 mL    Total NET: 120 mL          LABS:                        9.4    10.1  )-----------( 242      ( 06 Dec 2017 07:37 )             29.8     12-    140  |  106  |  31<H>  ----------------------------<  129<H>  3.9   |  25  |  1.34<H>    Ca    7.8<L>      06 Dec 2017 07:37  Phos  1.8     12  Mg     2.1     12-    TPro  5.7<L>  /  Alb  2.3<L>  /  TBili  0.7  /  DBili  x   /  AST  20  /  ALT  25  /  AlkPhos  77  12-    PT/INR - ( 06 Dec 2017 07:37 )   PT: 47.9 sec;   INR: 4.27 ratio           Urinalysis Basic - ( 05 Dec 2017 15:41 )    Color: Yellow / Appearance: Clear / S.015 / pH: x  Gluc: x / Ketone: Negative  / Bili: Negative / Urobili: Negative mg/dL   Blood: x / Protein: Negative mg/dL / Nitrite: Negative   Leuk Esterase: Negative / RBC: 6-10 /HPF / WBC 0-2   Sq Epi: x / Non Sq Epi: Moderate / Bacteria: Moderate        Impression: 89 year old male  admitted with ischemic bowel 2/2 incarcerated R inguinal hernia POD #11 s/p ex lap, BEATRIS, R inguinal hernia reduction, restarted on coumadin for AFIB now on hold for 3days    Plan:  - continue dysphasia diet  - f/u AM labs  - local wound care per surgical team  - pain management PRN, incentive spirometer  - Discharge planning  - will d/w Dr. Starks

## 2017-12-07 NOTE — PROGRESS NOTE ADULT - SUBJECTIVE AND OBJECTIVE BOX
Initial Consultaion Note  Requested by Name: Angela PA  Date/ Time: 12/7/17  Reason for referral/ Consultation: Goals of care conversation.  HPI:  89 y/o male PMHx of a-fib s/p pacer, BPH, CHF, CVA, GERD, hip fx s/p hip replacement, HTN, seed implantation for prostate cancer, kidney cyst removal c/o coffee ground emesis, abdominal distention, and severe upper abdominal pain x 3 days. Patient is a poor historian so most of history was given by daughter in law Anneliese. States that the patient's aids noticed these symptoms for the past 3 days, worsening today. Coffee ground emesis witnessed by ED RN. Patient denies pain or nausea now. Last BM was yesterday and normal. Last ate at 7pm. Patient denies fever, chills, constipation, diarrhea, hematuria, melena, hematochezia, chest pain, shortness of breath, dizziness. Patient denies prior incident.  PAST MEDICAL & SURGICAL HISTORY:  Hip fracture  Afib: on Coumadin  Peripheral neuropathy  CVA (cerebral infarction): no deficits  GERD (gastroesophageal reflux disease)  BPH (benign prostatic hypertrophy)  Prostate cancer  CHF (congestive heart failure)  HTN (hypertension)  H/O prostate cancer: s/p seed implantation  Status post-operative repair of hip fracture  Artificial cardiac pacemaker  SOCIAL HISTORY: Admitted from: home [ ] SNF [ ] [facility] [ ] [facility] [ ]                                                                smoker [ ] past smoker [ ] non smoker[ ]                                                              no alcohol [ ] social alcohol [ ] alcohol [ ]  Surrogate/ HCP/ Guardian: [ ] YES [ ] NO. Name/ Phone#: Usman Gutierrez   Significant other/partner:                     Children:                         Christian/Spirituality:    FAMILY HISTORY:  No pertinent family history in first degree relatives  Baseline ADLs (Prior to admission)  Independent:  Moderately [ ] fully [ ]   Dependent: moderately [ ] fully [x ]  ADVANCE DIRECTIVES:  [x ] YES [ ] NO   DNR: YES [ ] NO [  ]  Completed on:                     MOLST: YES [ ] NO [  ]  Completed on:  Living Will: YES [x ] NO [  ]  Completed on:  Allergies  No Known Allergies  Intolerances    MEDICATIONS  (STANDING):  aspirin  chewable 81 milliGRAM(s) Oral daily  brimonidine 0.2% Ophthalmic Solution 1 Drop(s) Both EYES two times a day  carvedilol 3.125 milliGRAM(s) Oral every 12 hours  gabapentin 300 milliGRAM(s) Oral daily  QUEtiapine 25 milliGRAM(s) Oral three times a day  risperiDONE   Tablet 1 milliGRAM(s) Oral daily    MEDICATIONS  (PRN):  acetaminophen   Tablet. 650 milliGRAM(s) Oral every 6 hours PRN Mild Pain (1 - 3)  benzocaine 15 mG/menthol 3.6 mG Lozenge 1 Lozenge Oral every 4 hours PRN Sore Throat  bisacodyl Suppository 10 milliGRAM(s) Rectal daily PRN Constipation  melatonin 3 milliGRAM(s) Oral at bedtime PRN Insomnia  oxyCODONE    5 mG/acetaminophen 325 mG 1 Tablet(s) Oral every 6 hours PRN Moderate Pain (4 - 6)    OPIATE NAIVE: (Y/N)  I STOP REVIEWED: (Y/N) : (#-------------------)   Review of Systems:     PAIN : (Y/N) (0-10)  PAIN SITE : generalized  QUALITY/QUANTITY OF PAIN :  dull      PAIN RADIATING :( Y/N) SEVERITY :            FREQUENCY :  IMPACT ON ADLs :  total care    DYSPNEA: Yes [  ] No [ x ] Mild [ ] Moderate [ ] Severe [ ]  NAUSEA/VOMITING: Yes [  ] No [ x ]  EXCESSIVE SECRETIONS: YES [  ] NO [x  ]  DEPRESSION: Yes [x  ] No [  ] Mild [ ]Moderate [x ] Severe [ ]  ANXIETY: Yes [ x ] No [  ]  AGITATION: Yes [ x ] No [  ]  CONSTIPATION : Yes [  ] No [ x ]  DIARRHEA : Yes [  ] No [ x ]  FRAILTY SYNDROME: Yes [ x ] No [  ]  FAILURE TO THRIVE: Yes [ x ] No [  ]  DEBILITY Yes [ x ] No [  ]  OTHER SYMPTOMS :  UNABLE TO OBTAIN DUE TO POOR MENTATION [x ]  PHYSICAL EXAM:  T(F): 98.9 (12-07-17 @ 11:50), Max: 98.9 (12-07-17 @ 11:50)  HR: 80 (12-07-17 @ 11:50) (59 - 80)  BP: 152/78 (12-07-17 @ 11:50) (117/62 - 152/78)  RR: 20 (12-07-17 @ 11:50) (17 - 20)  SpO2: 97% (12-07-17 @ 11:50) (97% - 100%)  Wt(kg): --   WEIGHT :                      BMI:  I&O's Summary  06 Dec 2017 07:01  -  07 Dec 2017 07:00  --------------------------------------------------------  IN: 120 mL / OUT: 0 mL / NET: 120 mL  CAPILLARY BLOOD GLUCOSE  GENERAL: alert: Yes [x ] No [  ]oriented x 2______ lethargic [x ] agitated [ ] cachexia [ ] nonverbal [ ] coma [ ]  HEENT: normal [x ] dry mouth [ ] Bipap [  ] ET tube/trach [ ]Vent [  ] excessive secretions [ ]  LUNGS: Comfortable [x ] tachypnea/ labored breathing[ ]   CV :  normal [x ] tachycardia [ ]bradycardia [  ]   GI : normal [ ] distended [x ] tender [ ] no BS [ ]  PEG /NG tube [ ]   : normal [ ] incontinent [x ] oliguria/anuria [ ] Sparks [ ]  MSK : normal [ ] weakness [x ] edema [ ] ambulatory [ ] bedbound/ wheelchair bound [x ]   SKIN : normal [x ] pressure ulcer: Yes [  ] No [  ] Stage ______________ rash: Yes [  ] No [  ]    Functional Assessment:   Karnofsky Performance Score : <30  Palliative Performance Status Version 2:         %  LABS:                       8.8    10.0  )-----------( 272      ( 07 Dec 2017 07:06 )             28.1     12-07    143  |  108  |  25<H>  ----------------------------<  125<H>  4.0   |  28  |  1.29    Ca    7.7<L>      07 Dec 2017 07:06  Phos  2.4     12-07  Mg     1.9     12-07  PT/INR - ( 07 Dec 2017 07:06 )   PT: 46.5 sec;   INR: 4.14 ratio    I&O's Detail  06 Dec 2017 07:01  -  07 Dec 2017 07:00  --------------------------------------------------------  IN:    Oral Fluid: 120 mL  Total IN: 120 mL  OUT:  Total OUT: 0 mL  Total NET: 120 mL  Assessment:   89y Male admitted with UPPER GASTROINTESTINAL BLEED, ISCHEMIC BOWEL SYNDROME  VOMIT  PROBLEM LIST :  PROBLEM/RECOMMENDATION: 1) Problem : Goals of care, counseling/ discussion.  Recommendation: Meet with patient and spoke to family Matt (son/ hcp) and discussed GOC & Advanced care planning                                    Palliative care information /counseling                                        Advanced Directives addressed   PROBLEM/ RECOMMENDATION:2)Problem :Resuscitation/ DNR/ DNI,   Recommendation: DNR/ DNI    PROBLEM/ RECOMMENDATION :3)Problem : Medical order for life sustaining treatment  Recommendation : MOLST Form to be completed.  PROBLEM/RECOMMENDATION :4)Problem : Advanced care planning  Recommendation : Family meeting on: 12/7/17 Spoke to Mr. Matt Quinteros and Anneliese quinteros and discussed goals of care and advance care planning.   Patient is DNR/ DNI and Anneliese will bring the paper work tomorrow.  Plan is to D/C to Rehab when medically stable.   PLAN:  REFERRALS: Hospice: YES [  ] NO [  ]                    Palliative Med : YES [  ] NO [  ]                   Unit SW/Case Mgmt: YES [  ] NO [  ]                   : YES [  ] NO [  ]                   Speech/Swallow: YES [  ] NO [  ]                   Pain Management: YES [  ] NO [  ]                   Nutrition: YES [  ] NO [  ]                    Ethics: YES [  ] NO [  ]                   PT/OT: YES [  ] NO [  ]  Symptom Management Recommendations: DNR/ DNI, Comfort measures.

## 2017-12-07 NOTE — PROGRESS NOTE ADULT - SUBJECTIVE AND OBJECTIVE BOX
· Subjective and Objective: 	  Patient is a 89y old  Male who presents with a chief complaint of Coffee ground emesis, abdominal distention, and upper abdominal pain x 3 days (05 Dec 2017 22:19)    s/p Laparotomy with BEATRIS  INTERVAL HPI/OVERNIGHT EVENTS: agitated. , no respiratory distress.    MEDICATIONS  (STANDING):  aspirin  chewable 81 milliGRAM(s) Oral daily  brimonidine 0.2% Ophthalmic Solution 1 Drop(s) Both EYES two times a day  carvedilol 3.125 milliGRAM(s) Oral every 12 hours  gabapentin 300 milliGRAM(s) Oral daily  potassium phosphate IVPB 15 milliMole(s) IV Intermittent once  QUEtiapine 25 milliGRAM(s) Oral three times a day    MEDICATIONS  (PRN):  acetaminophen   Tablet. 650 milliGRAM(s) Oral every 6 hours PRN Mild Pain (1 - 3)  benzocaine 15 mG/menthol 3.6 mG Lozenge 1 Lozenge Oral every 4 hours PRN Sore Throat  bisacodyl Suppository 10 milliGRAM(s) Rectal daily PRN Constipation  melatonin 3 milliGRAM(s) Oral at bedtime PRN Insomnia  oxyCODONE    5 mG/acetaminophen 325 mG 1 Tablet(s) Oral every 6 hours PRN Moderate Pain (4 - 6)      Allergies    No Known Allergies    Intolerances        REVIEW OF SYSTEMS:  CONSTITUTIONAL: No fever, weight loss, or fatigue  EYES: No eye pain, visual disturbances, or discharge  ENMT:  No difficulty hearing, tinnitus, vertigo; No sinus or throat pain  NECK: No pain or stiffness  BREASTS: No pain, masses, or nipple discharge  RESPIRATORY: No cough, wheezing, chills or hemoptysis; No shortness of breath  CARDIOVASCULAR: No chest pain, palpitations, dizziness, or leg swelling  GASTROINTESTINAL: No abdominal or epigastric pain. No nausea, vomiting, or hematemesis; No diarrhea or constipation. No melena or hematochezia.  GENITOURINARY: No dysuria, frequency, hematuria, or incontinence  NEUROLOGICAL: No headaches, memory loss, loss of strength, numbness, or tremors  SKIN: No itching, burning, rashes, or lesions   LYMPH NODES: No enlarged glands  ENDOCRINE: No heat or cold intolerance; No hair loss  MUSCULOSKELETAL: No joint pain or swelling; No muscle, back, or extremity pain  PSYCHIATRIC: No depression, anxiety, mood swings, or difficulty sleeping  HEME/LYMPH: No easy bruising, or bleeding gums  ALLERY AND IMMUNOLOGIC: No hives or eczema    Vital Signs Last 24 Hrs  T(C): 36.5 (07 Dec 2017 06:00), Max: 37.1 (06 Dec 2017 17:30)  T(F): 97.7 (07 Dec 2017 06:00), Max: 98.8 (06 Dec 2017 17:30)  HR: 59 (07 Dec 2017 06:00) (59 - 60)  BP: 117/62 (07 Dec 2017 06:00) (117/62 - 139/56)  BP(mean): --  RR: 17 (07 Dec 2017 06:00) (17 - 20)  SpO2: 100% (07 Dec 2017 06:00) (99% - 100%)    PHYSICAL EXAM:  GENERAL: NAD, well-groomed, well-developed  HEAD:  Atraumatic, Normocephalic  EYES: EOMI, PERRLA, conjunctiva and sclera clear  ENMT: No tonsillar erythema, exudates, or enlargement; Moist mucous membranes, Good dentition, No lesions  NECK: Supple, No JVD, Normal thyroid  NERVOUS SYSTEM:  confused, Good concentration; Motor Strength 5/5 B/L upper and lower extremities; DTRs 2+ intact and symmetric  CHEST/LUNG: Clear to percussion bilaterally; No rales, rhonchi, wheezing, or rubs  HEART: Regular rate and rhythm; No murmurs, rubs, or gallops  ABDOMEN: Soft, Nontender, Nondistended; Bowel sounds present  EXTREMITIES:  2+ Peripheral Pulses, No clubbing, cyanosis, or edema  LYMPH: No lymphadenopathy noted  SKIN: No rashes or lesions    LABS:                        8.8    10.0  )-----------( 272      ( 07 Dec 2017 07:06 )             28.1     12-07    143  |  108  |  25<H>  ----------------------------<  125<H>  4.0   |  28  |  1.29    Ca    7.7<L>      07 Dec 2017 07:06  Phos  2.4     12-07  Mg     1.9     12-07        PT/INR - ( 07 Dec 2017 07:06 )   PT: 46.5 sec;   INR: 4.14 ratio       a/p surgery  Hemodynamically stable   adv age , overall prognosis poor   pt is DNR  controll sx

## 2017-12-07 NOTE — GOALS OF CARE CONVERSATION - PERSONAL ADVANCE DIRECTIVE - TREATMENT GUIDELINE COMMENT
Family meeting on: 12/7/17 Spoke to Mr. Matt Quinteros and Anneliese quinteros and discussed goals of care and advance care planning.   Patient is DNR/ DNI and Anneliese will bring the paper work tomorrow.  Plan is to D/C to Rehab when medically stable.

## 2017-12-07 NOTE — PROGRESS NOTE ADULT - SUBJECTIVE AND OBJECTIVE BOX
Patient is a 89y old  Male who presents with a chief complaint of Coffee ground emesis, abdominal distention, and upper abdominal pain x 3 days (05 Dec 2017 22:19)    s/p Laparotomy with BEATRIS  INTERVAL HPI/OVERNIGHT EVENTS: agitated. , no respiratory distress.    MEDICATIONS  (STANDING):  aspirin  chewable 81 milliGRAM(s) Oral daily  brimonidine 0.2% Ophthalmic Solution 1 Drop(s) Both EYES two times a day  carvedilol 3.125 milliGRAM(s) Oral every 12 hours  gabapentin 300 milliGRAM(s) Oral daily  potassium phosphate IVPB 15 milliMole(s) IV Intermittent once  QUEtiapine 25 milliGRAM(s) Oral three times a day    MEDICATIONS  (PRN):  acetaminophen   Tablet. 650 milliGRAM(s) Oral every 6 hours PRN Mild Pain (1 - 3)  benzocaine 15 mG/menthol 3.6 mG Lozenge 1 Lozenge Oral every 4 hours PRN Sore Throat  bisacodyl Suppository 10 milliGRAM(s) Rectal daily PRN Constipation  melatonin 3 milliGRAM(s) Oral at bedtime PRN Insomnia  oxyCODONE    5 mG/acetaminophen 325 mG 1 Tablet(s) Oral every 6 hours PRN Moderate Pain (4 - 6)      Allergies    No Known Allergies    Intolerances        REVIEW OF SYSTEMS:  CONSTITUTIONAL: No fever, weight loss, or fatigue  EYES: No eye pain, visual disturbances, or discharge  ENMT:  No difficulty hearing, tinnitus, vertigo; No sinus or throat pain  NECK: No pain or stiffness  BREASTS: No pain, masses, or nipple discharge  RESPIRATORY: No cough, wheezing, chills or hemoptysis; No shortness of breath  CARDIOVASCULAR: No chest pain, palpitations, dizziness, or leg swelling  GASTROINTESTINAL: No abdominal or epigastric pain. No nausea, vomiting, or hematemesis; No diarrhea or constipation. No melena or hematochezia.  GENITOURINARY: No dysuria, frequency, hematuria, or incontinence  NEUROLOGICAL: No headaches, memory loss, loss of strength, numbness, or tremors  SKIN: No itching, burning, rashes, or lesions   LYMPH NODES: No enlarged glands  ENDOCRINE: No heat or cold intolerance; No hair loss  MUSCULOSKELETAL: No joint pain or swelling; No muscle, back, or extremity pain  PSYCHIATRIC: No depression, anxiety, mood swings, or difficulty sleeping  HEME/LYMPH: No easy bruising, or bleeding gums  ALLERY AND IMMUNOLOGIC: No hives or eczema    Vital Signs Last 24 Hrs  T(C): 36.5 (07 Dec 2017 06:00), Max: 37.1 (06 Dec 2017 17:30)  T(F): 97.7 (07 Dec 2017 06:00), Max: 98.8 (06 Dec 2017 17:30)  HR: 59 (07 Dec 2017 06:00) (59 - 60)  BP: 117/62 (07 Dec 2017 06:00) (117/62 - 139/56)  BP(mean): --  RR: 17 (07 Dec 2017 06:00) (17 - 20)  SpO2: 100% (07 Dec 2017 06:00) (99% - 100%)    PHYSICAL EXAM:  GENERAL: NAD, well-groomed, well-developed  HEAD:  Atraumatic, Normocephalic  EYES: EOMI, PERRLA, conjunctiva and sclera clear  ENMT: No tonsillar erythema, exudates, or enlargement; Moist mucous membranes, Good dentition, No lesions  NECK: Supple, No JVD, Normal thyroid  NERVOUS SYSTEM:  confused, Good concentration; Motor Strength 5/5 B/L upper and lower extremities; DTRs 2+ intact and symmetric  CHEST/LUNG: Clear to percussion bilaterally; No rales, rhonchi, wheezing, or rubs  HEART: Regular rate and rhythm; No murmurs, rubs, or gallops  ABDOMEN: Soft, Nontender, Nondistended; Bowel sounds present  EXTREMITIES:  2+ Peripheral Pulses, No clubbing, cyanosis, or edema  LYMPH: No lymphadenopathy noted  SKIN: No rashes or lesions    LABS:                        8.8    10.0  )-----------( 272      ( 07 Dec 2017 07:06 )             28.1     12-    143  |  108  |  25<H>  ----------------------------<  125<H>  4.0   |  28  |  1.29    Ca    7.7<L>      07 Dec 2017 07:06  Phos  2.4     12-  Mg     1.9     12-        PT/INR - ( 07 Dec 2017 07:06 )   PT: 46.5 sec;   INR: 4.14 ratio           Urinalysis Basic - ( 05 Dec 2017 15:41 )    Color: Yellow / Appearance: Clear / S.015 / pH: x  Gluc: x / Ketone: Negative  / Bili: Negative / Urobili: Negative mg/dL   Blood: x / Protein: Negative mg/dL / Nitrite: Negative   Leuk Esterase: Negative / RBC: 6-10 /HPF / WBC 0-2   Sq Epi: x / Non Sq Epi: Moderate / Bacteria: Moderate      CAPILLARY BLOOD GLUCOSE                    RADIOLOGY & ADDITIONAL TESTS:    Imaging Personally Reviewed:  [ ] YES  [ ] NO    Consultant(s) Notes Reviewed:  [ ] YES  [ ] NO    Care Discussed with Consultants/Other Providers [ ] YES  [ ] NO    Care discussed with family,         [  ]   yes  [  ]  No    imp:    stable[ ]    unstable[  ]     improving [  x ]       unchanged  [  ]                Plans:  Continue present plans  [ x ] recommenr Palliative care/ hospice care. Start risperidone 1 mg daily and monitor agitation. continue to hold coumadin and give coumadin 1 mg every other day once INR <2               New consult [  ]   specialty  .......               order test[  ]    test name.                  Discharge Planning  [  ]

## 2017-12-07 NOTE — GOALS OF CARE CONVERSATION - PERSONAL ADVANCE DIRECTIVE - CONVERSATION DETAILS
89 y/o male PMHx of a-fib s/p pacer, BPH, CHF, CVA, GERD, hip fx s/p hip replacement, HTN, seed implantation for prostate cancer, kidney cyst removal c/o coffee ground emesis, abdominal distention, and severe upper abdominal pain x 3 days. Patient is a poor historian so most of history was given by daughter in law Anneliese. States that the patient's aids noticed these symptoms for the past 3 days, worsening today. Coffee ground emesis witnessed by ED RN. Patient denies pain or nausea now. Last BM was yesterday and normal. Last ate at 7pm. Patient denies fever, chills, constipation, diarrhea, hematuria, melena, hematochezia, chest pain, shortness of breath, dizziness. Patient denies prior incident.     Family meeting on: 12/7/17 Spoke to Mr. Matt Quinteros and Anneliese quinteros and discussed goals of care and advance care planning.   Patient is DNR/ DNI and Anneliese will bring the paper work tomorrow.  Plan is to D/C to Rehab when medically stable.

## 2017-12-08 LAB
ANION GAP SERPL CALC-SCNC: 8 MMOL/L — SIGNIFICANT CHANGE UP (ref 5–17)
BLD GP AB SCN SERPL QL: SIGNIFICANT CHANGE UP
BUN SERPL-MCNC: 23 MG/DL — SIGNIFICANT CHANGE UP (ref 7–23)
CALCIUM SERPL-MCNC: 8 MG/DL — LOW (ref 8.5–10.1)
CHLORIDE SERPL-SCNC: 111 MMOL/L — HIGH (ref 96–108)
CO2 SERPL-SCNC: 26 MMOL/L — SIGNIFICANT CHANGE UP (ref 22–31)
CREAT SERPL-MCNC: 1.05 MG/DL — SIGNIFICANT CHANGE UP (ref 0.5–1.3)
GLUCOSE SERPL-MCNC: 105 MG/DL — HIGH (ref 70–99)
HCT VFR BLD CALC: 29.2 % — LOW (ref 39–50)
HGB BLD-MCNC: 9.2 G/DL — LOW (ref 13–17)
INR BLD: 4.2 RATIO — HIGH (ref 0.88–1.16)
MAGNESIUM SERPL-MCNC: 2.1 MG/DL — SIGNIFICANT CHANGE UP (ref 1.6–2.6)
MCHC RBC-ENTMCNC: 31.4 GM/DL — LOW (ref 32–36)
MCHC RBC-ENTMCNC: 31.9 PG — SIGNIFICANT CHANGE UP (ref 27–34)
MCV RBC AUTO: 101.5 FL — HIGH (ref 80–100)
PHOSPHATE SERPL-MCNC: 2.6 MG/DL — SIGNIFICANT CHANGE UP (ref 2.5–4.5)
PLATELET # BLD AUTO: 276 K/UL — SIGNIFICANT CHANGE UP (ref 150–400)
POTASSIUM SERPL-MCNC: 4.5 MMOL/L — SIGNIFICANT CHANGE UP (ref 3.5–5.3)
POTASSIUM SERPL-SCNC: 4.5 MMOL/L — SIGNIFICANT CHANGE UP (ref 3.5–5.3)
PROTHROM AB SERPL-ACNC: 47.1 SEC — HIGH (ref 9.8–12.7)
RBC # BLD: 2.88 M/UL — LOW (ref 4.2–5.8)
RBC # FLD: 14.2 % — SIGNIFICANT CHANGE UP (ref 11–15)
SODIUM SERPL-SCNC: 145 MMOL/L — SIGNIFICANT CHANGE UP (ref 135–145)
WBC # BLD: 10.6 K/UL — HIGH (ref 3.8–10.5)
WBC # FLD AUTO: 10.6 K/UL — HIGH (ref 3.8–10.5)

## 2017-12-08 RX ADMIN — OXYCODONE AND ACETAMINOPHEN 1 TABLET(S): 5; 325 TABLET ORAL at 02:13

## 2017-12-08 RX ADMIN — QUETIAPINE FUMARATE 25 MILLIGRAM(S): 200 TABLET, FILM COATED ORAL at 06:30

## 2017-12-08 RX ADMIN — QUETIAPINE FUMARATE 25 MILLIGRAM(S): 200 TABLET, FILM COATED ORAL at 21:23

## 2017-12-08 RX ADMIN — QUETIAPINE FUMARATE 25 MILLIGRAM(S): 200 TABLET, FILM COATED ORAL at 14:44

## 2017-12-08 RX ADMIN — BRIMONIDINE TARTRATE 1 DROP(S): 2 SOLUTION/ DROPS OPHTHALMIC at 17:39

## 2017-12-08 RX ADMIN — OXYCODONE AND ACETAMINOPHEN 1 TABLET(S): 5; 325 TABLET ORAL at 03:13

## 2017-12-08 RX ADMIN — BRIMONIDINE TARTRATE 1 DROP(S): 2 SOLUTION/ DROPS OPHTHALMIC at 06:30

## 2017-12-08 RX ADMIN — OXYCODONE AND ACETAMINOPHEN 1 TABLET(S): 5; 325 TABLET ORAL at 12:17

## 2017-12-08 RX ADMIN — OXYCODONE AND ACETAMINOPHEN 1 TABLET(S): 5; 325 TABLET ORAL at 11:24

## 2017-12-08 RX ADMIN — Medication 3 MILLIGRAM(S): at 21:25

## 2017-12-08 RX ADMIN — RISPERIDONE 1 MILLIGRAM(S): 4 TABLET ORAL at 09:40

## 2017-12-08 RX ADMIN — Medication 81 MILLIGRAM(S): at 11:24

## 2017-12-08 RX ADMIN — GABAPENTIN 300 MILLIGRAM(S): 400 CAPSULE ORAL at 11:24

## 2017-12-08 NOTE — PROGRESS NOTE ADULT - SUBJECTIVE AND OBJECTIVE BOX
Pt seen and examined at bedside this afternoon, c/o of ABD distension. Pt is tolerating diet well, +flatus and BM on 12/6. Pt denies CP, SOB, N/V/D, dizziness, HA and fever.     T(C): 36.8 (08 Dec 2017 05:00), Max: 37.2 (07 Dec 2017 11:50)  T(F): 98.2 (08 Dec 2017 05:00), Max: 98.9 (07 Dec 2017 11:50)  HR: 58 (08 Dec 2017 06:24) (58 - 80)  BP: 137/61 (08 Dec 2017 05:00) (129/73 - 152/78)  RR: 18 (08 Dec 2017 05:00) (18 - 20)  SpO2: 96% (08 Dec 2017 05:00) (96% - 98%)                          9.2    10.6  )-----------( 276      ( 08 Dec 2017 06:23 )             29.2     12-08    145  |  111<H>  |  23  ----------------------------<  105<H>  4.5   |  26  |  1.05    Ca    8.0<L>      08 Dec 2017 06:23  Phos  2.6     12-08  Mg     2.1     12-08    PT/INR - ( 08 Dec 2017 06:23 )   PT: 47.1 sec;   INR: 4.20 ratio      General: calm, cooperative, pleasant, NAD   Cardiac: RRR, no rubs or gallops  Lungs: vesicular breat sounds b/l, no wheezing, rales or rhonchi   ABD: softly distended, midline incision min erythema with staples healing well, normal BS, nontender   LE: no redness, calf nontender b/l      Impression:  89 yo male pmhx of AFIB, HTN, CHF and prostate cancer, admitted with ischemic bowel 2/2 incarcerated R inguinal hernia POD #12 s/p ex lap, BEATRIS, R inguinal hernia reduction, no bowel resection     Plan:  -cont dysphagia diet as tolerated   -Trend INR -- hold coumadin for now  -f/u with labs   -wound care  -OOB to chair to ambulate as tolerated/ PT walking pt  bowel regimen   -PO pain meds   continue pain management/supportive care   will discuss with attending             Jose OTERO

## 2017-12-08 NOTE — PROGRESS NOTE ADULT - SUBJECTIVE AND OBJECTIVE BOX
LAYS FLAT  CONVERSANT  NO DISTRESS  NO JVD  COARSE BREATH SOUNDS  SOFT HEART SOUNDS  SURGICAL WOUND/SCAR: CLEAN DRY & INTACT  NO EDEMA    STABLE CARDIOVASCULAR STATUS; CONTINUING WITH PRESENT MANAGEMENT.

## 2017-12-08 NOTE — PROGRESS NOTE ADULT - SUBJECTIVE AND OBJECTIVE BOX
Patient is a 89y old  Male who presents with a chief complaint of Coffee ground emesis, abdominal distention, and upper abdominal pain x 3 days (05 Dec 2017 22:19)  s/p lap, BEATRIS. , Chronic A. fib, PPM, dmenetia. . INR still elevated.      INTERVAL HPI/OVERNIGHT EVENTS: still agitated at times.    MEDICATIONS  (STANDING):  aspirin  chewable 81 milliGRAM(s) Oral daily  brimonidine 0.2% Ophthalmic Solution 1 Drop(s) Both EYES two times a day  carvedilol 3.125 milliGRAM(s) Oral every 12 hours  gabapentin 300 milliGRAM(s) Oral daily  QUEtiapine 25 milliGRAM(s) Oral three times a day  risperiDONE   Tablet 1 milliGRAM(s) Oral daily    MEDICATIONS  (PRN):  acetaminophen   Tablet. 650 milliGRAM(s) Oral every 6 hours PRN Mild Pain (1 - 3)  benzocaine 15 mG/menthol 3.6 mG Lozenge 1 Lozenge Oral every 4 hours PRN Sore Throat  bisacodyl Suppository 10 milliGRAM(s) Rectal daily PRN Constipation  melatonin 3 milliGRAM(s) Oral at bedtime PRN Insomnia  oxyCODONE    5 mG/acetaminophen 325 mG 1 Tablet(s) Oral every 6 hours PRN Moderate Pain (4 - 6)      Allergies    No Known Allergies    Intolerances        REVIEW OF SYSTEMS:  CONSTITUTIONAL: No fever, weight loss, or fatigue  EYES: No eye pain, visual disturbances, or discharge  ENMT:  No difficulty hearing, tinnitus, vertigo; No sinus or throat pain  NECK: No pain or stiffness  BREASTS: No pain, masses, or nipple discharge  RESPIRATORY: No cough, wheezing, chills or hemoptysis; No shortness of breath  CARDIOVASCULAR: No chest pain, palpitations, dizziness, or leg swelling  GASTROINTESTINAL: No abdominal or epigastric pain. No nausea, vomiting, or hematemesis; No diarrhea or constipation. No melena or hematochezia.  GENITOURINARY: No dysuria, frequency, hematuria, or incontinence  NEUROLOGICAL: No headaches, memory loss, loss of strength, numbness, or tremors  SKIN: No itching, burning, rashes, or lesions   LYMPH NODES: No enlarged glands  ENDOCRINE: No heat or cold intolerance; No hair loss  MUSCULOSKELETAL: No joint pain or swelling; No muscle, back, or extremity pain  PSYCHIATRIC: No depression, anxiety, mood swings, or difficulty sleeping  HEME/LYMPH: No easy bruising, or bleeding gums  ALLERY AND IMMUNOLOGIC: No hives or eczema    Vital Signs Last 24 Hrs  T(C): 36.8 (08 Dec 2017 05:00), Max: 37.2 (07 Dec 2017 11:50)  T(F): 98.2 (08 Dec 2017 05:00), Max: 98.9 (07 Dec 2017 11:50)  HR: 58 (08 Dec 2017 06:24) (58 - 80)  BP: 137/61 (08 Dec 2017 05:00) (129/73 - 152/78)  BP(mean): --  RR: 18 (08 Dec 2017 05:00) (18 - 20)  SpO2: 96% (08 Dec 2017 05:00) (96% - 98%)    PHYSICAL EXAM:  GENERAL: NAD, well-groomed, well-developed  HEAD:  Atraumatic, Normocephalic  EYES: EOMI, PERRLA, conjunctiva and sclera clear  ENMT: No tonsillar erythema, exudates, or enlargement; Moist mucous membranes, Good dentition, No lesions  NECK: Supple, No JVD, Normal thyroid  NERVOUS SYSTEM:  Alert & Oriented X3, Good concentration; Motor Strength 5/5 B/L upper and lower extremities; DTRs 2+ intact and symmetric  CHEST/LUNG: Clear to percussion bilaterally; No rales, rhonchi, wheezing, or rubs  HEART: Regular rate and rhythm; No murmurs, rubs, or gallops  ABDOMEN: Soft, Nontender, Nondistended; Bowel sounds present  EXTREMITIES:  2+ Peripheral Pulses, No clubbing, cyanosis, or edema  LYMPH: No lymphadenopathy noted  SKIN: No rashes or lesions. no bruising    LABS:                        9.2    10.6  )-----------( 276      ( 08 Dec 2017 06:23 )             29.2     12-08    145  |  111<H>  |  23  ----------------------------<  105<H>  4.5   |  26  |  1.05    Ca    8.0<L>      08 Dec 2017 06:23  Phos  2.6     12-08  Mg     2.1     12-08        PT/INR - ( 08 Dec 2017 06:23 )   PT: 47.1 sec;   INR: 4.20 ratio             CAPILLARY BLOOD GLUCOSE                    RADIOLOGY & ADDITIONAL TESTS:    Imaging Personally Reviewed:  [ ] YES  [ ] NO    Consultant(s) Notes Reviewed:  [ ] YES  [ ] NO    Care Discussed with Consultants/Other Providers [ ] YES  [ ] NO    Care discussed with family,         [  ]   yes  [  ]  No    imp:    stable[ ]    unstable[  ]     improving [   x]       unchanged  [  ]                Plans:  Continue present plans  [ x ] continue to hold  coumadin.               New consult [  ]   specialty  .......               order test[  ]    test name.                  Discharge Planning  [  ]

## 2017-12-09 LAB
ALBUMIN SERPL ELPH-MCNC: 2.7 G/DL — LOW (ref 3.3–5)
ALP SERPL-CCNC: 99 U/L — SIGNIFICANT CHANGE UP (ref 40–120)
ALT FLD-CCNC: 24 U/L — SIGNIFICANT CHANGE UP (ref 12–78)
ANION GAP SERPL CALC-SCNC: 6 MMOL/L — SIGNIFICANT CHANGE UP (ref 5–17)
ANION GAP SERPL CALC-SCNC: 8 MMOL/L — SIGNIFICANT CHANGE UP (ref 5–17)
APTT BLD: 47.7 SEC — HIGH (ref 27.5–37.4)
AST SERPL-CCNC: 20 U/L — SIGNIFICANT CHANGE UP (ref 15–37)
BILIRUB SERPL-MCNC: 1 MG/DL — SIGNIFICANT CHANGE UP (ref 0.2–1.2)
BUN SERPL-MCNC: 20 MG/DL — SIGNIFICANT CHANGE UP (ref 7–23)
BUN SERPL-MCNC: 21 MG/DL — SIGNIFICANT CHANGE UP (ref 7–23)
CALCIUM SERPL-MCNC: 8.1 MG/DL — LOW (ref 8.5–10.1)
CALCIUM SERPL-MCNC: 8.3 MG/DL — LOW (ref 8.5–10.1)
CHLORIDE SERPL-SCNC: 108 MMOL/L — SIGNIFICANT CHANGE UP (ref 96–108)
CHLORIDE SERPL-SCNC: 108 MMOL/L — SIGNIFICANT CHANGE UP (ref 96–108)
CO2 SERPL-SCNC: 28 MMOL/L — SIGNIFICANT CHANGE UP (ref 22–31)
CO2 SERPL-SCNC: 31 MMOL/L — SIGNIFICANT CHANGE UP (ref 22–31)
CREAT SERPL-MCNC: 1.29 MG/DL — SIGNIFICANT CHANGE UP (ref 0.5–1.3)
CREAT SERPL-MCNC: 1.4 MG/DL — HIGH (ref 0.5–1.3)
GLUCOSE SERPL-MCNC: 138 MG/DL — HIGH (ref 70–99)
GLUCOSE SERPL-MCNC: 98 MG/DL — SIGNIFICANT CHANGE UP (ref 70–99)
HCT VFR BLD CALC: 30.4 % — LOW (ref 39–50)
HGB BLD-MCNC: 9.4 G/DL — LOW (ref 13–17)
INR BLD: 3.93 RATIO — HIGH (ref 0.88–1.16)
MAGNESIUM SERPL-MCNC: 1.9 MG/DL — SIGNIFICANT CHANGE UP (ref 1.6–2.6)
MAGNESIUM SERPL-MCNC: 2 MG/DL — SIGNIFICANT CHANGE UP (ref 1.6–2.6)
MCHC RBC-ENTMCNC: 30.9 GM/DL — LOW (ref 32–36)
MCHC RBC-ENTMCNC: 31.2 PG — SIGNIFICANT CHANGE UP (ref 27–34)
MCV RBC AUTO: 101.1 FL — HIGH (ref 80–100)
PHOSPHATE SERPL-MCNC: 1.8 MG/DL — LOW (ref 2.5–4.5)
PHOSPHATE SERPL-MCNC: 3 MG/DL — SIGNIFICANT CHANGE UP (ref 2.5–4.5)
PLATELET # BLD AUTO: 351 K/UL — SIGNIFICANT CHANGE UP (ref 150–400)
POTASSIUM SERPL-MCNC: 4.1 MMOL/L — SIGNIFICANT CHANGE UP (ref 3.5–5.3)
POTASSIUM SERPL-MCNC: 4.4 MMOL/L — SIGNIFICANT CHANGE UP (ref 3.5–5.3)
POTASSIUM SERPL-SCNC: 4.1 MMOL/L — SIGNIFICANT CHANGE UP (ref 3.5–5.3)
POTASSIUM SERPL-SCNC: 4.4 MMOL/L — SIGNIFICANT CHANGE UP (ref 3.5–5.3)
PROT SERPL-MCNC: 6.5 GM/DL — SIGNIFICANT CHANGE UP (ref 6–8.3)
PROTHROM AB SERPL-ACNC: 44 SEC — HIGH (ref 9.8–12.7)
RBC # BLD: 3 M/UL — LOW (ref 4.2–5.8)
RBC # FLD: 14.4 % — SIGNIFICANT CHANGE UP (ref 11–15)
SODIUM SERPL-SCNC: 144 MMOL/L — SIGNIFICANT CHANGE UP (ref 135–145)
SODIUM SERPL-SCNC: 145 MMOL/L — SIGNIFICANT CHANGE UP (ref 135–145)
WBC # BLD: 11.7 K/UL — HIGH (ref 3.8–10.5)
WBC # FLD AUTO: 11.7 K/UL — HIGH (ref 3.8–10.5)

## 2017-12-09 RX ORDER — SENNA PLUS 8.6 MG/1
2 TABLET ORAL AT BEDTIME
Qty: 0 | Refills: 0 | Status: DISCONTINUED | OUTPATIENT
Start: 2017-12-09 | End: 2017-12-12

## 2017-12-09 RX ORDER — DOCUSATE SODIUM 100 MG
100 CAPSULE ORAL DAILY
Qty: 0 | Refills: 0 | Status: DISCONTINUED | OUTPATIENT
Start: 2017-12-09 | End: 2017-12-12

## 2017-12-09 RX ADMIN — Medication 63.75 MILLIMOLE(S): at 10:39

## 2017-12-09 RX ADMIN — OXYCODONE AND ACETAMINOPHEN 1 TABLET(S): 5; 325 TABLET ORAL at 23:27

## 2017-12-09 RX ADMIN — BRIMONIDINE TARTRATE 1 DROP(S): 2 SOLUTION/ DROPS OPHTHALMIC at 05:16

## 2017-12-09 RX ADMIN — QUETIAPINE FUMARATE 25 MILLIGRAM(S): 200 TABLET, FILM COATED ORAL at 14:27

## 2017-12-09 RX ADMIN — OXYCODONE AND ACETAMINOPHEN 1 TABLET(S): 5; 325 TABLET ORAL at 06:01

## 2017-12-09 RX ADMIN — CARVEDILOL PHOSPHATE 3.12 MILLIGRAM(S): 80 CAPSULE, EXTENDED RELEASE ORAL at 05:16

## 2017-12-09 RX ADMIN — CARVEDILOL PHOSPHATE 3.12 MILLIGRAM(S): 80 CAPSULE, EXTENDED RELEASE ORAL at 18:10

## 2017-12-09 RX ADMIN — QUETIAPINE FUMARATE 25 MILLIGRAM(S): 200 TABLET, FILM COATED ORAL at 05:16

## 2017-12-09 RX ADMIN — SENNA PLUS 2 TABLET(S): 8.6 TABLET ORAL at 23:57

## 2017-12-09 RX ADMIN — Medication 10 MILLIGRAM(S): at 23:57

## 2017-12-09 RX ADMIN — GABAPENTIN 300 MILLIGRAM(S): 400 CAPSULE ORAL at 12:20

## 2017-12-09 RX ADMIN — QUETIAPINE FUMARATE 25 MILLIGRAM(S): 200 TABLET, FILM COATED ORAL at 21:27

## 2017-12-09 RX ADMIN — RISPERIDONE 1 MILLIGRAM(S): 4 TABLET ORAL at 12:20

## 2017-12-09 RX ADMIN — OXYCODONE AND ACETAMINOPHEN 1 TABLET(S): 5; 325 TABLET ORAL at 07:01

## 2017-12-09 RX ADMIN — Medication 81 MILLIGRAM(S): at 12:20

## 2017-12-09 RX ADMIN — Medication 100 MILLIGRAM(S): at 23:57

## 2017-12-09 RX ADMIN — BRIMONIDINE TARTRATE 1 DROP(S): 2 SOLUTION/ DROPS OPHTHALMIC at 18:10

## 2017-12-09 NOTE — PROGRESS NOTE ADULT - SUBJECTIVE AND OBJECTIVE BOX
· Subjective and Objective: 	  Patient is a 89y old  Male who presents with a chief complaint of Coffee ground emesis, abdominal distention, and upper abdominal pain x 3 days (05 Dec 2017 22:19)  No dyspnea. No fever. Hypopjosphatemic,.  INR 3.93.    INTERVAL HPI/OVERNIGHT EVENTS: uneventful. occsionally still agitated, but better.    MEDICATIONS  (STANDING):  aspirin  chewable 81 milliGRAM(s) Oral daily  brimonidine 0.2% Ophthalmic Solution 1 Drop(s) Both EYES two times a day  carvedilol 3.125 milliGRAM(s) Oral every 12 hours  gabapentin 300 milliGRAM(s) Oral daily  QUEtiapine 25 milliGRAM(s) Oral three times a day  risperiDONE   Tablet 1 milliGRAM(s) Oral daily    MEDICATIONS  (PRN):  acetaminophen   Tablet. 650 milliGRAM(s) Oral every 6 hours PRN Mild Pain (1 - 3)  benzocaine 15 mG/menthol 3.6 mG Lozenge 1 Lozenge Oral every 4 hours PRN Sore Throat  bisacodyl Suppository 10 milliGRAM(s) Rectal daily PRN Constipation  melatonin 3 milliGRAM(s) Oral at bedtime PRN Insomnia  oxyCODONE    5 mG/acetaminophen 325 mG 1 Tablet(s) Oral every 6 hours PRN Moderate Pain (4 - 6)        Vital Signs Last 24 Hrs  T(C): 37.2 (09 Dec 2017 05:00), Max: 37.2 (09 Dec 2017 05:00)  T(F): 99 (09 Dec 2017 05:00), Max: 99 (09 Dec 2017 05:00)  HR: 64 (09 Dec 2017 05:00) (60 - 64)  BP: 132/60 (09 Dec 2017 05:00) (117/57 - 161/72)  BP(mean): --  RR: 17 (09 Dec 2017 05:00) (17 - 20)  SpO2: 97% (09 Dec 2017 05:00) (93% - 100%)    PHYSICAL EXAM:  GENERAL: NAD, well-groomed, well-developed  HEAD:  Atraumatic, Normocephalic  EYES: EOMI, PERRLA, conjunctiva and sclera clear  ENMT: No tonsillar erythema, exudates, or enlargement; Moist mucous membranes, Good dentition, No lesions  NECK: Supple, No JVD, Normal thyroid  NERVOUS SYSTEM:  Alert & Oriented X3, Good concentration; Motor Strength 5/5 B/L upper and lower extremities; DTRs 2+ intact and symmetric  CHEST/LUNG: Clear to percussion bilaterally; No rales, rhonchi, wheezing, or rubs  HEART: Regular rate and rhythm; No murmurs, rubs, or gallops  LABS:                        9.4    11.7  )-----------( 351      ( 09 Dec 2017 06:19 )             30.4     12-09    144  |  108  |  21  ----------------------------<  138<H>  4.4   |  28  |  1.40<H>    Ca    8.3<L>      09 Dec 2017 06:19  Phos  1.8     12-09  Mg     1.9     12-09    TPro  6.5  /  Alb  2.7<L>  /  TBili  1.0  /  DBili  x   /  AST  20  /  ALT  24  /  AlkPhos  99  12Care discussed with family,         [  ]   yes  [  ]  No      Assessment and Plan:   · Assessment		  hemodynamiclly stable. dementia, A. fib , PPM.HTn    Adv age w multiple comorbidities and poor prognosis   sx controlled sx   DNR

## 2017-12-09 NOTE — PROGRESS NOTE ADULT - SUBJECTIVE AND OBJECTIVE BOX
Patient is a 89y old  Male who presents with a chief complaint of Coffee ground emesis, abdominal distention, and upper abdominal pain x 3 days (05 Dec 2017 22:19)  No dyspnea. No fever. Hypopjosphatemic,.  INR 3.93.    INTERVAL HPI/OVERNIGHT EVENTS: uneventful. occsionally still agitated, but better.    MEDICATIONS  (STANDING):  aspirin  chewable 81 milliGRAM(s) Oral daily  brimonidine 0.2% Ophthalmic Solution 1 Drop(s) Both EYES two times a day  carvedilol 3.125 milliGRAM(s) Oral every 12 hours  gabapentin 300 milliGRAM(s) Oral daily  QUEtiapine 25 milliGRAM(s) Oral three times a day  risperiDONE   Tablet 1 milliGRAM(s) Oral daily    MEDICATIONS  (PRN):  acetaminophen   Tablet. 650 milliGRAM(s) Oral every 6 hours PRN Mild Pain (1 - 3)  benzocaine 15 mG/menthol 3.6 mG Lozenge 1 Lozenge Oral every 4 hours PRN Sore Throat  bisacodyl Suppository 10 milliGRAM(s) Rectal daily PRN Constipation  melatonin 3 milliGRAM(s) Oral at bedtime PRN Insomnia  oxyCODONE    5 mG/acetaminophen 325 mG 1 Tablet(s) Oral every 6 hours PRN Moderate Pain (4 - 6)      Allergies    No Known Allergies    Intolerances        REVIEW OF SYSTEMS:  CONSTITUTIONAL: No fever, weight loss, or fatigue  EYES: No eye pain, visual disturbances, or discharge  ENMT:  No difficulty hearing, tinnitus, vertigo; No sinus or throat pain  NECK: No pain or stiffness  BREASTS: No pain, masses, or nipple discharge  RESPIRATORY: No cough, wheezing, chills or hemoptysis; No shortness of breath  CARDIOVASCULAR: No chest pain, palpitations, dizziness, or leg swelling  GASTROINTESTINAL: No abdominal or epigastric pain. No nausea, vomiting, or hematemesis; No diarrhea or constipation. No melena or hematochezia.  GENITOURINARY: No dysuria, frequency, hematuria, or incontinence  NEUROLOGICAL: No headaches, memory loss, loss of strength, numbness, or tremors  SKIN: No itching, burning, rashes, or lesions   LYMPH NODES: No enlarged glands  ENDOCRINE: No heat or cold intolerance; No hair loss  MUSCULOSKELETAL: No joint pain or swelling; No muscle, back, or extremity pain  PSYCHIATRIC: No depression, anxiety, mood swings, or difficulty sleeping  HEME/LYMPH: No easy bruising, or bleeding gums  ALLERY AND IMMUNOLOGIC: No hives or eczema    Vital Signs Last 24 Hrs  T(C): 37.2 (09 Dec 2017 05:00), Max: 37.2 (09 Dec 2017 05:00)  T(F): 99 (09 Dec 2017 05:00), Max: 99 (09 Dec 2017 05:00)  HR: 64 (09 Dec 2017 05:00) (60 - 64)  BP: 132/60 (09 Dec 2017 05:00) (117/57 - 161/72)  BP(mean): --  RR: 17 (09 Dec 2017 05:00) (17 - 20)  SpO2: 97% (09 Dec 2017 05:00) (93% - 100%)    PHYSICAL EXAM:  GENERAL: NAD, well-groomed, well-developed  HEAD:  Atraumatic, Normocephalic  EYES: EOMI, PERRLA, conjunctiva and sclera clear  ENMT: No tonsillar erythema, exudates, or enlargement; Moist mucous membranes, Good dentition, No lesions  NECK: Supple, No JVD, Normal thyroid  NERVOUS SYSTEM:  Alert & Oriented X3, Good concentration; Motor Strength 5/5 B/L upper and lower extremities; DTRs 2+ intact and symmetric  CHEST/LUNG: Clear to percussion bilaterally; No rales, rhonchi, wheezing, or rubs  HEART: Regular rate and rhythm; No murmurs, rubs, or gallops  ABDOMEN: Soft, Nontender, Nondistended; Bowel sounds present  EXTREMITIES:  2+ Peripheral Pulses, No clubbing, cyanosis, or edema  LYMPH: No lymphadenopathy noted  SKIN: No rashes or lesions    LABS:                        9.4    11.7  )-----------( 351      ( 09 Dec 2017 06:19 )             30.4     12-09    144  |  108  |  21  ----------------------------<  138<H>  4.4   |  28  |  1.40<H>    Ca    8.3<L>      09 Dec 2017 06:19  Phos  1.8     12-09  Mg     1.9     12-09    TPro  6.5  /  Alb  2.7<L>  /  TBili  1.0  /  DBili  x   /  AST  20  /  ALT  24  /  AlkPhos  99  12-09      PT/INR - ( 09 Dec 2017 06:19 )   PT: 44.0 sec;   INR: 3.93 ratio         PTT - ( 09 Dec 2017 06:19 )  PTT:47.7 sec    CAPILLARY BLOOD GLUCOSE                    RADIOLOGY & ADDITIONAL TESTS:    Imaging Personally Reviewed:  [ ] YES  [ ] NO    Consultant(s) Notes Reviewed:  [ ] YES  [ ] NO    Care Discussed with Consultants/Other Providers [ ] YES  [ ] NO    Care discussed with family,         [  ]   yes  [  ]  No    imp:    stable[ ]    unstable[  ]     improving [ x  ]       unchanged  [  ]                Plans:  Continue present plans  [ x ] continue to hold coumadin. Phosphate supplementation               New consult [  ]   specialty  .......               order test[  ]    test name.                  Discharge Planning  [  ]

## 2017-12-09 NOTE — PROGRESS NOTE ADULT - SUBJECTIVE AND OBJECTIVE BOX
Pt seen and examined this morning at bedside with no new complaints. Pt denies SOB, CP, ABD pain or distension. Pt is tolerating diet well, +flatus and BM on 12/6.      T(C): 36.8 (09 Dec 2017 11:35), Max: 37.2 (09 Dec 2017 05:00)  T(F): 98.2 (09 Dec 2017 11:35), Max: 99 (09 Dec 2017 05:00)  HR: 63 (09 Dec 2017 11:35) (60 - 64)  BP: 120/56 (09 Dec 2017 11:35) (117/57 - 161/72)  RR: 18 (09 Dec 2017 11:35) (17 - 20)  SpO2: 100% (09 Dec 2017 11:35) (93% - 100%)                          9.4    11.7  )-----------( 351      ( 09 Dec 2017 06:19 )             30.4   12-09    144  |  108  |  21  ----------------------------<  138<H>  4.4   |  28  |  1.40<H>    Ca    8.3<L>      09 Dec 2017 06:19  Phos  1.8     12-09  Mg     1.9     12-09    TPro  6.5  /  Alb  2.7<L>  /  TBili  1.0  /  DBili  x   /  AST  20  /  ALT  24  /  AlkPhos  99  12-09  PT/INR - ( 09 Dec 2017 06:19 )   PT: 44.0 sec;   INR: 3.93 ratio         PTT - ( 09 Dec 2017 06:19 )  PTT:47.7 sec    PE:  General: calm, cooperative, pleasant, NAD   Cardiac: RRR, no rubs or gallops  Lungs: vesicular breat sounds b/l, no wheezing, rales or rhonchi   ABD: softly distended, midline incision min erythema with staples healing well, normal BS, nontender   LE: no redness, calf nontender b/l      Impression:  89 yo male pmhx of AFIB, HTN, CHF and prostate cancer, admitted with ischemic bowel 2/2 incarcerated R inguinal hernia POD #13 s/p ex lap, BEATRIS, R inguinal hernia reduction, no bowel resection     Plan:  -Plan discharge   -cont dysphagia diet as tolerated   -Trend INR -- hold coumadin for now  -f/u with labs   -wound care  -OOB to chair to ambulate as tolerated/ PT walking pt  bowel regimen   -PO pain meds   continue pain management/supportive care   will discuss with attending

## 2017-12-10 LAB
ANION GAP SERPL CALC-SCNC: 6 MMOL/L — SIGNIFICANT CHANGE UP (ref 5–17)
BUN SERPL-MCNC: 19 MG/DL — SIGNIFICANT CHANGE UP (ref 7–23)
CALCIUM SERPL-MCNC: 7.9 MG/DL — LOW (ref 8.5–10.1)
CHLORIDE SERPL-SCNC: 109 MMOL/L — HIGH (ref 96–108)
CO2 SERPL-SCNC: 31 MMOL/L — SIGNIFICANT CHANGE UP (ref 22–31)
CREAT SERPL-MCNC: 1.23 MG/DL — SIGNIFICANT CHANGE UP (ref 0.5–1.3)
GLUCOSE SERPL-MCNC: 101 MG/DL — HIGH (ref 70–99)
HCT VFR BLD CALC: 27.7 % — LOW (ref 39–50)
HGB BLD-MCNC: 8.8 G/DL — LOW (ref 13–17)
INR BLD: 3.55 RATIO — HIGH (ref 0.88–1.16)
MCHC RBC-ENTMCNC: 31.6 GM/DL — LOW (ref 32–36)
MCHC RBC-ENTMCNC: 31.9 PG — SIGNIFICANT CHANGE UP (ref 27–34)
MCV RBC AUTO: 101 FL — HIGH (ref 80–100)
PHOSPHATE SERPL-MCNC: 2.9 MG/DL — SIGNIFICANT CHANGE UP (ref 2.5–4.5)
PLATELET # BLD AUTO: 307 K/UL — SIGNIFICANT CHANGE UP (ref 150–400)
POTASSIUM SERPL-MCNC: 4.1 MMOL/L — SIGNIFICANT CHANGE UP (ref 3.5–5.3)
POTASSIUM SERPL-SCNC: 4.1 MMOL/L — SIGNIFICANT CHANGE UP (ref 3.5–5.3)
PROTHROM AB SERPL-ACNC: 39.7 SEC — HIGH (ref 9.8–12.7)
RBC # BLD: 2.75 M/UL — LOW (ref 4.2–5.8)
RBC # FLD: 14.5 % — SIGNIFICANT CHANGE UP (ref 11–15)
SODIUM SERPL-SCNC: 146 MMOL/L — HIGH (ref 135–145)
WBC # BLD: 7.8 K/UL — SIGNIFICANT CHANGE UP (ref 3.8–10.5)
WBC # FLD AUTO: 7.8 K/UL — SIGNIFICANT CHANGE UP (ref 3.8–10.5)

## 2017-12-10 RX ADMIN — Medication 100 MILLIGRAM(S): at 12:13

## 2017-12-10 RX ADMIN — Medication 81 MILLIGRAM(S): at 12:13

## 2017-12-10 RX ADMIN — GABAPENTIN 300 MILLIGRAM(S): 400 CAPSULE ORAL at 12:13

## 2017-12-10 RX ADMIN — BRIMONIDINE TARTRATE 1 DROP(S): 2 SOLUTION/ DROPS OPHTHALMIC at 17:07

## 2017-12-10 RX ADMIN — QUETIAPINE FUMARATE 25 MILLIGRAM(S): 200 TABLET, FILM COATED ORAL at 13:46

## 2017-12-10 RX ADMIN — QUETIAPINE FUMARATE 25 MILLIGRAM(S): 200 TABLET, FILM COATED ORAL at 22:02

## 2017-12-10 RX ADMIN — OXYCODONE AND ACETAMINOPHEN 1 TABLET(S): 5; 325 TABLET ORAL at 00:27

## 2017-12-10 RX ADMIN — BRIMONIDINE TARTRATE 1 DROP(S): 2 SOLUTION/ DROPS OPHTHALMIC at 05:01

## 2017-12-10 RX ADMIN — QUETIAPINE FUMARATE 25 MILLIGRAM(S): 200 TABLET, FILM COATED ORAL at 05:01

## 2017-12-10 RX ADMIN — OXYCODONE AND ACETAMINOPHEN 1 TABLET(S): 5; 325 TABLET ORAL at 23:45

## 2017-12-10 RX ADMIN — OXYCODONE AND ACETAMINOPHEN 1 TABLET(S): 5; 325 TABLET ORAL at 17:00

## 2017-12-10 RX ADMIN — OXYCODONE AND ACETAMINOPHEN 1 TABLET(S): 5; 325 TABLET ORAL at 16:07

## 2017-12-10 RX ADMIN — SENNA PLUS 2 TABLET(S): 8.6 TABLET ORAL at 22:02

## 2017-12-10 RX ADMIN — CARVEDILOL PHOSPHATE 3.12 MILLIGRAM(S): 80 CAPSULE, EXTENDED RELEASE ORAL at 05:01

## 2017-12-10 RX ADMIN — RISPERIDONE 1 MILLIGRAM(S): 4 TABLET ORAL at 12:13

## 2017-12-10 NOTE — PROGRESS NOTE ADULT - SUBJECTIVE AND OBJECTIVE BOX
Patient is a 89y old  Male who presents with a chief complaint of Coffee ground emesis, abdominal distention, and upper abdominal pain x 3 days (05 Dec 2017 22:19)  no complaints today. INR 3.55.     INTERVAL HPI/OVERNIGHT EVENTS: uneventful. Poor oral intake    MEDICATIONS  (STANDING):  aspirin  chewable 81 milliGRAM(s) Oral daily  brimonidine 0.2% Ophthalmic Solution 1 Drop(s) Both EYES two times a day  carvedilol 3.125 milliGRAM(s) Oral every 12 hours  docusate sodium 100 milliGRAM(s) Oral daily  gabapentin 300 milliGRAM(s) Oral daily  QUEtiapine 25 milliGRAM(s) Oral three times a day  risperiDONE   Tablet 1 milliGRAM(s) Oral daily  senna 2 Tablet(s) Oral at bedtime    MEDICATIONS  (PRN):  acetaminophen   Tablet. 650 milliGRAM(s) Oral every 6 hours PRN Mild Pain (1 - 3)  benzocaine 15 mG/menthol 3.6 mG Lozenge 1 Lozenge Oral every 4 hours PRN Sore Throat  bisacodyl Suppository 10 milliGRAM(s) Rectal daily PRN Constipation  melatonin 3 milliGRAM(s) Oral at bedtime PRN Insomnia  oxyCODONE    5 mG/acetaminophen 325 mG 1 Tablet(s) Oral every 6 hours PRN Moderate Pain (4 - 6)      Allergies    No Known Allergies    Intolerances        REVIEW OF SYSTEMS:  CONSTITUTIONAL: No fever, weight loss, or fatigue  EYES: No eye pain, visual disturbances, or discharge  ENMT:  No difficulty hearing, tinnitus, vertigo; No sinus or throat pain  NECK: No pain or stiffness  BREASTS: No pain, masses, or nipple discharge  RESPIRATORY: No cough, wheezing, chills or hemoptysis; No shortness of breath  CARDIOVASCULAR: No chest pain, palpitations, dizziness, or leg swelling  GASTROINTESTINAL: No abdominal or epigastric pain. No nausea, vomiting, or hematemesis; No diarrhea or constipation. No melena or hematochezia.  GENITOURINARY: No dysuria, frequency, hematuria, or incontinence  NEUROLOGICAL: No headaches, memory loss, loss of strength, numbness, or tremors  SKIN: No itching, burning, rashes, or lesions   LYMPH NODES: No enlarged glands  ENDOCRINE: No heat or cold intolerance; No hair loss  MUSCULOSKELETAL: No joint pain or swelling; No muscle, back, or extremity pain  PSYCHIATRIC: No depression, anxiety, mood swings, or difficulty sleeping  HEME/LYMPH: No easy bruising, or bleeding gums  ALLERY AND IMMUNOLOGIC: No hives or eczema    Vital Signs Last 24 Hrs  T(C): 37.1 (10 Dec 2017 04:50), Max: 37.7 (10 Dec 2017 00:00)  T(F): 98.7 (10 Dec 2017 04:50), Max: 99.8 (10 Dec 2017 00:00)  HR: 60 (10 Dec 2017 04:50) (60 - 70)  BP: 131/75 (10 Dec 2017 04:50) (120/56 - 151/66)  BP(mean): --  RR: 16 (10 Dec 2017 04:50) (16 - 20)  SpO2: 96% (10 Dec 2017 04:50) (94% - 100%)    PHYSICAL EXAM:  GENERAL: NAD, well-groomed, well-developed  HEAD:  Atraumatic, Normocephalic  EYES: EOMI, PERRLA, conjunctiva and sclera clear  ENMT: No tonsillar erythema, exudates, or enlargement; Moist mucous membranes, Good dentition, No lesions  NECK: Supple, No JVD, Normal thyroid  NERVOUS SYSTEM:  Alert & Oriented X3, Good concentration; Motor Strength 5/5 B/L upper and lower extremities; DTRs 2+ intact and symmetric  CHEST/LUNG: Clear to percussion bilaterally; No rales, rhonchi, wheezing, or rubs  HEART: Regular rate and rhythm; No murmurs, rubs, or gallops  ABDOMEN: Soft, Nontender, Nondistended; Bowel sounds present  EXTREMITIES:  2+ Peripheral Pulses, No clubbing, cyanosis, or edema  LYMPH: No lymphadenopathy noted  SKIN: No rashes or lesions    LABS:                        9.4    11.7  )-----------( 351      ( 09 Dec 2017 06:19 )             30.4     12-10    146<H>  |  109<H>  |  19  ----------------------------<  101<H>  4.1   |  31  |  1.23    Ca    7.9<L>      10 Dec 2017 06:41  Phos  2.9     12-10  Mg     2.0     12-09    TPro  6.5  /  Alb  2.7<L>  /  TBili  1.0  /  DBili  x   /  AST  20  /  ALT  24  /  AlkPhos  99  12-09      PT/INR - ( 10 Dec 2017 06:41 )   PT: 39.7 sec;   INR: 3.55 ratio         PTT - ( 09 Dec 2017 06:19 )  PTT:47.7 sec    CAPILLARY BLOOD GLUCOSE                    RADIOLOGY & ADDITIONAL TESTS:    Imaging Personally Reviewed:  [ ] YES  [ ] NO    Consultant(s) Notes Reviewed:  [ ] YES  [ ] NO    Care Discussed with Consultants/Other Providers [ ] YES  [ ] NO    Care discussed with family,         [  ]   yes  [  ]  No    imp:    stable[ ]    unstable[  ]     improving [ x  ]       unchanged  [  ]                Plans:  Continue present plans  [x  ] continue to hold coumadintill INR is<2. the restart only on 1 mg daily.               New consult [  ]   specialty  .......               order test[  ]    test name.                  Discharge Planning  [  ]

## 2017-12-10 NOTE — PROGRESS NOTE ADULT - SUBJECTIVE AND OBJECTIVE BOX
Patient seen and examined at bedside in no distress.    Vital Signs Last 24 Hrs  T(F): 98.7 (12-10-17 @ 04:50), Max: 99.8 (12-10-17 @ 00:00)  HR: 60 (12-10-17 @ 04:50)  BP: 131/75 (12-10-17 @ 04:50)  RR: 16 (12-10-17 @ 04:50)  SpO2: 96% (12-10-17 @ 04:50)    GENERAL: Alert, NAD  CHEST/LUNG: Clear to auscultation bilaterally, respirations nonlabored  HEART: S1S2, Regular rate and rhythm  ABDOMEN: + Bowel sounds. Midline incision with intermittent staples c/d/i. Nondistended, nontender.   EXTREMITIES: No calf tenderness b/l, no pedal edema b/l       LABS:                        8.8    7.8   )-----------( 307      ( 10 Dec 2017 11:31 )             27.7     12-10    146<H>  |  109<H>  |  19  ----------------------------<  101<H>  4.1   |  31  |  1.23    Ca    7.9<L>      10 Dec 2017 06:41  Phos  2.9     12-10  Mg     2.0     12-09    TPro  6.5  /  Alb  2.7<L>  /  TBili  1.0  /  DBili  x   /  AST  20  /  ALT  24  /  AlkPhos  99  12-09    PT/INR - ( 10 Dec 2017 06:41 )   PT: 39.7 sec;   INR: 3.55 ratio         PTT - ( 09 Dec 2017 06:19 )  PTT:47.7 sec    Impression: 89 year old male PMH AFib, HTN, CHF, prostate CA a/w ischemic bowel due to incarcerated right inguinal hernia POD#14 s/p ex lap, BEATRIS, R inguinal hernia reduction, now with supratherapeutic INR due to coumadin, surgically stable  Plan:  - A/C per medicine, discharge planning when INR acceptable  - continue dysphagia diet  - local wound care   - pain management PRN, OOB with PT, incentive spirometer  - continue medical follow up  - will d/w Dr. Starks

## 2017-12-11 LAB
ANION GAP SERPL CALC-SCNC: 6 MMOL/L — SIGNIFICANT CHANGE UP (ref 5–17)
BUN SERPL-MCNC: 22 MG/DL — SIGNIFICANT CHANGE UP (ref 7–23)
CALCIUM SERPL-MCNC: 8.1 MG/DL — LOW (ref 8.5–10.1)
CHLORIDE SERPL-SCNC: 108 MMOL/L — SIGNIFICANT CHANGE UP (ref 96–108)
CO2 SERPL-SCNC: 31 MMOL/L — SIGNIFICANT CHANGE UP (ref 22–31)
CREAT SERPL-MCNC: 1.28 MG/DL — SIGNIFICANT CHANGE UP (ref 0.5–1.3)
GLUCOSE SERPL-MCNC: 97 MG/DL — SIGNIFICANT CHANGE UP (ref 70–99)
HCT VFR BLD CALC: 28.4 % — LOW (ref 39–50)
HGB BLD-MCNC: 8.9 G/DL — LOW (ref 13–17)
INR BLD: 3.24 RATIO — HIGH (ref 0.88–1.16)
MAGNESIUM SERPL-MCNC: 2.4 MG/DL — SIGNIFICANT CHANGE UP (ref 1.6–2.6)
MCHC RBC-ENTMCNC: 31.3 GM/DL — LOW (ref 32–36)
MCHC RBC-ENTMCNC: 32.3 PG — SIGNIFICANT CHANGE UP (ref 27–34)
MCV RBC AUTO: 103.1 FL — HIGH (ref 80–100)
PHOSPHATE SERPL-MCNC: 2.7 MG/DL — SIGNIFICANT CHANGE UP (ref 2.5–4.5)
PLATELET # BLD AUTO: 326 K/UL — SIGNIFICANT CHANGE UP (ref 150–400)
POTASSIUM SERPL-MCNC: 4.3 MMOL/L — SIGNIFICANT CHANGE UP (ref 3.5–5.3)
POTASSIUM SERPL-SCNC: 4.3 MMOL/L — SIGNIFICANT CHANGE UP (ref 3.5–5.3)
PROTHROM AB SERPL-ACNC: 36.2 SEC — HIGH (ref 9.8–12.7)
RBC # BLD: 2.76 M/UL — LOW (ref 4.2–5.8)
RBC # FLD: 14.1 % — SIGNIFICANT CHANGE UP (ref 11–15)
SODIUM SERPL-SCNC: 145 MMOL/L — SIGNIFICANT CHANGE UP (ref 135–145)
WBC # BLD: 7.4 K/UL — SIGNIFICANT CHANGE UP (ref 3.8–10.5)
WBC # FLD AUTO: 7.4 K/UL — SIGNIFICANT CHANGE UP (ref 3.8–10.5)

## 2017-12-11 PROCEDURE — 74000: CPT | Mod: 26

## 2017-12-11 PROCEDURE — 73502 X-RAY EXAM HIP UNI 2-3 VIEWS: CPT | Mod: 26,RT

## 2017-12-11 PROCEDURE — 72220 X-RAY EXAM SACRUM TAILBONE: CPT | Mod: 26

## 2017-12-11 RX ORDER — SIMETHICONE 80 MG/1
80 TABLET, CHEWABLE ORAL EVERY 8 HOURS
Qty: 0 | Refills: 0 | Status: DISCONTINUED | OUTPATIENT
Start: 2017-12-11 | End: 2017-12-12

## 2017-12-11 RX ADMIN — RISPERIDONE 1 MILLIGRAM(S): 4 TABLET ORAL at 11:20

## 2017-12-11 RX ADMIN — OXYCODONE AND ACETAMINOPHEN 1 TABLET(S): 5; 325 TABLET ORAL at 00:45

## 2017-12-11 RX ADMIN — Medication 81 MILLIGRAM(S): at 11:20

## 2017-12-11 RX ADMIN — SIMETHICONE 80 MILLIGRAM(S): 80 TABLET, CHEWABLE ORAL at 21:13

## 2017-12-11 RX ADMIN — QUETIAPINE FUMARATE 25 MILLIGRAM(S): 200 TABLET, FILM COATED ORAL at 13:33

## 2017-12-11 RX ADMIN — BRIMONIDINE TARTRATE 1 DROP(S): 2 SOLUTION/ DROPS OPHTHALMIC at 06:23

## 2017-12-11 RX ADMIN — QUETIAPINE FUMARATE 25 MILLIGRAM(S): 200 TABLET, FILM COATED ORAL at 06:23

## 2017-12-11 RX ADMIN — Medication 100 MILLIGRAM(S): at 11:20

## 2017-12-11 RX ADMIN — GABAPENTIN 300 MILLIGRAM(S): 400 CAPSULE ORAL at 11:20

## 2017-12-11 RX ADMIN — BRIMONIDINE TARTRATE 1 DROP(S): 2 SOLUTION/ DROPS OPHTHALMIC at 17:41

## 2017-12-11 RX ADMIN — QUETIAPINE FUMARATE 25 MILLIGRAM(S): 200 TABLET, FILM COATED ORAL at 21:13

## 2017-12-11 RX ADMIN — SENNA PLUS 2 TABLET(S): 8.6 TABLET ORAL at 21:13

## 2017-12-11 NOTE — PROGRESS NOTE ADULT - SUBJECTIVE AND OBJECTIVE BOX
Patient is a 89y old  Male who presents with a chief complaint of Coffee ground emesis, abdominal distention, and upper abdominal pain x 3 days (05 Dec 2017 22:19)    s/p lyly SPEAR. FRANKLIN robertson PPm  INTERVAL HPI/OVERNIGHT EVENTS: uneventful. INR 3.24    MEDICATIONS  (STANDING):  aspirin  chewable 81 milliGRAM(s) Oral daily  brimonidine 0.2% Ophthalmic Solution 1 Drop(s) Both EYES two times a day  carvedilol 3.125 milliGRAM(s) Oral every 12 hours  docusate sodium 100 milliGRAM(s) Oral daily  gabapentin 300 milliGRAM(s) Oral daily  QUEtiapine 25 milliGRAM(s) Oral three times a day  risperiDONE   Tablet 1 milliGRAM(s) Oral daily  senna 2 Tablet(s) Oral at bedtime    MEDICATIONS  (PRN):  acetaminophen   Tablet. 650 milliGRAM(s) Oral every 6 hours PRN Mild Pain (1 - 3)  benzocaine 15 mG/menthol 3.6 mG Lozenge 1 Lozenge Oral every 4 hours PRN Sore Throat  bisacodyl Suppository 10 milliGRAM(s) Rectal daily PRN Constipation  melatonin 3 milliGRAM(s) Oral at bedtime PRN Insomnia  oxyCODONE    5 mG/acetaminophen 325 mG 1 Tablet(s) Oral every 6 hours PRN Moderate Pain (4 - 6)      Allergies    No Known Allergies    Intolerances        REVIEW OF SYSTEMS:  CONSTITUTIONAL: No fever, weight loss, or fatigue  EYES: No eye pain, visual disturbances, or discharge  ENMT:  No difficulty hearing, tinnitus, vertigo; No sinus or throat pain  NECK: No pain or stiffness  BREASTS: No pain, masses, or nipple discharge  RESPIRATORY: No cough, wheezing, chills or hemoptysis; No shortness of breath  CARDIOVASCULAR: No chest pain, palpitations, dizziness, or leg swelling  GASTROINTESTINAL: No abdominal or epigastric pain. No nausea, vomiting, or hematemesis; No diarrhea or constipation. No melena or hematochezia.  GENITOURINARY: No dysuria, frequency, hematuria, or incontinence  NEUROLOGICAL: No headaches, memory loss, loss of strength, numbness, or tremors  SKIN: No itching, burning, rashes, or lesions   LYMPH NODES: No enlarged glands  ENDOCRINE: No heat or cold intolerance; No hair loss  MUSCULOSKELETAL: No joint pain or swelling; No muscle, back, or extremity pain  PSYCHIATRIC: No depression, anxiety, mood swings, or difficulty sleeping  HEME/LYMPH: No easy bruising, or bleeding gums  ALLERY AND IMMUNOLOGIC: No hives or eczema    Vital Signs Last 24 Hrs  T(C): 36.5 (11 Dec 2017 06:23), Max: 36.6 (10 Dec 2017 15:50)  T(F): 97.7 (11 Dec 2017 06:23), Max: 97.9 (10 Dec 2017 15:50)  HR: 59 (11 Dec 2017 06:23) (59 - 60)  BP: 138/58 (11 Dec 2017 06:23) (114/57 - 142/62)  BP(mean): --  RR: 18 (11 Dec 2017 06:23) (17 - 20)  SpO2: 100% (11 Dec 2017 06:23) (92% - 100%)    PHYSICAL EXAM:  GENERAL: NAD, well-groomed, well-developed  HEAD:  Atraumatic, Normocephalic  EYES: EOMI, PERRLA, conjunctiva and sclera clear  ENMT: No tonsillar erythema, exudates, or enlargement; Moist mucous membranes, Good dentition, No lesions  NECK: Supple, No JVD, Normal thyroid  NERVOUS SYSTEM:  Alert & Oriented X3, Good concentration; Motor Strength 5/5 B/L upper and lower extremities; DTRs 2+ intact and symmetric  CHEST/LUNG: Clear to percussion bilaterally; No rales, rhonchi, wheezing, or rubs  HEART: Regular rate and rhythm; No murmurs, rubs, or gallops  ABDOMEN: Soft, Nontender, Nondistended; Bowel sounds present  EXTREMITIES:  2+ Peripheral Pulses, No clubbing, cyanosis, or edema  LYMPH: No lymphadenopathy noted  SKIN: No rashes or lesions    LABS:                        8.9    7.4   )-----------( 326      ( 11 Dec 2017 09:45 )             28.4     12-11    145  |  108  |  22  ----------------------------<  97  4.3   |  31  |  1.28    Ca    8.1<L>      11 Dec 2017 09:45  Phos  2.7     12-11  Mg     2.4     12-11        PT/INR - ( 11 Dec 2017 07:52 )   PT: 36.2 sec;   INR: 3.24 ratio             CAPILLARY BLOOD GLUCOSE                    RADIOLOGY & ADDITIONAL TESTS:    Imaging Personally Reviewed:  [ ] YES  [ ] NO    Consultant(s) Notes Reviewed:  [ ] YES  [ ] NO    Care Discussed with Consultants/Other Providers [x] YES  [ ] NO    Care discussed with family,         [  ]   yes  [  ]  No    imp:    stable[ ]    unstable[  ]     improving [   ]       unchanged  [  ]                Plans:  Continue present plans  [ x ] continue to hold coumadin. repeat INR in 2 daays. . Can be followed up by PMD as Op to titrate coumadin dosage               New consult [  ]   specialty  .......               order test[  ]    test name.                  Discharge Planning  [  ]

## 2017-12-11 NOTE — PROGRESS NOTE ADULT - SUBJECTIVE AND OBJECTIVE BOX
Patient seen and examined at bedside in no distress.  Eating breakfast.  +BM and voiding without difficulty per RN.    Vital Signs Last 24 Hrs  T(F): 97.7 (12-11-17 @ 06:23), Max: 97.9 (12-10-17 @ 15:50)  HR: 59 (12-11-17 @ 06:23)  BP: 138/58 (12-11-17 @ 06:23)  RR: 18 (12-11-17 @ 06:23)  SpO2: 100% (12-11-17 @ 06:23)    GENERAL: Awake, alert, NAD  HEENT: NC in place  CHEST/LUNG: Clear to auscultation bilaterally, respirations nonlabored  HEART: S1S2, Regular rate and rhythm  ABDOMEN: + Bowel sounds. Midline incision with intermittent staples c/d/i, no signs of infection. Soft, nondistended, nontender.  EXTREMITIES: 1+pitting edema b/l LE. No calf tenderness b/l     LABS:           AM labs pending      PT/INR - ( 11 Dec 2017 07:52 )   PT: 36.2 sec;   INR: 3.24 ratio      Impression: 89 year old male PMH AFib, HTN, CHF, prostate CA a/w ischemic bowel due to incarcerated right inguinal hernia POD#15 s/p ex lap, BEATRIS, R inguinal hernia reduction, now with supratherapeutic INR due to coumadin, surgically stable  Plan:  - f/u AM labs  - A/C per medicine, consider discharge to rehab/home with close INR monitoring, will d/w medical attending  - continue dysphagia diet as tolerated  - local wound care, likely remove staples before discharge  - pain management PRN, OOB with PT, incentive spirometer  - continue medical follow up  - will d/w Dr. Starks

## 2017-12-11 NOTE — PROGRESS NOTE ADULT - SUBJECTIVE AND OBJECTIVE BOX
· Subjective and Objective: 	  Patient is a 89y old  Male who presents with a chief complaint of Coffee ground emesis, abdominal distention, and upper abdominal pain x 3 days (05 Dec 2017 22:19)     as evident still having sx   DNR  poor prognosis cont current care   no change in GOC

## 2017-12-12 VITALS
DIASTOLIC BLOOD PRESSURE: 58 MMHG | SYSTOLIC BLOOD PRESSURE: 147 MMHG | OXYGEN SATURATION: 99 % | HEART RATE: 60 BPM | TEMPERATURE: 97 F | RESPIRATION RATE: 17 BRPM

## 2017-12-12 LAB
ANION GAP SERPL CALC-SCNC: 7 MMOL/L — SIGNIFICANT CHANGE UP (ref 5–17)
BUN SERPL-MCNC: 24 MG/DL — HIGH (ref 7–23)
CALCIUM SERPL-MCNC: 8.7 MG/DL — SIGNIFICANT CHANGE UP (ref 8.5–10.1)
CHLORIDE SERPL-SCNC: 106 MMOL/L — SIGNIFICANT CHANGE UP (ref 96–108)
CO2 SERPL-SCNC: 29 MMOL/L — SIGNIFICANT CHANGE UP (ref 22–31)
CREAT SERPL-MCNC: 1.38 MG/DL — HIGH (ref 0.5–1.3)
GLUCOSE SERPL-MCNC: 128 MG/DL — HIGH (ref 70–99)
HCT VFR BLD CALC: 30.3 % — LOW (ref 39–50)
HGB BLD-MCNC: 9.4 G/DL — LOW (ref 13–17)
INR BLD: 2.89 RATIO — HIGH (ref 0.88–1.16)
MCHC RBC-ENTMCNC: 31 GM/DL — LOW (ref 32–36)
MCHC RBC-ENTMCNC: 31.7 PG — SIGNIFICANT CHANGE UP (ref 27–34)
MCV RBC AUTO: 102.2 FL — HIGH (ref 80–100)
PLATELET # BLD AUTO: 390 K/UL — SIGNIFICANT CHANGE UP (ref 150–400)
POTASSIUM SERPL-MCNC: 4.2 MMOL/L — SIGNIFICANT CHANGE UP (ref 3.5–5.3)
POTASSIUM SERPL-SCNC: 4.2 MMOL/L — SIGNIFICANT CHANGE UP (ref 3.5–5.3)
PROTHROM AB SERPL-ACNC: 32.2 SEC — HIGH (ref 9.8–12.7)
RBC # BLD: 2.97 M/UL — LOW (ref 4.2–5.8)
RBC # FLD: 14.2 % — SIGNIFICANT CHANGE UP (ref 11–15)
SODIUM SERPL-SCNC: 142 MMOL/L — SIGNIFICANT CHANGE UP (ref 135–145)
WBC # BLD: 7.8 K/UL — SIGNIFICANT CHANGE UP (ref 3.8–10.5)
WBC # FLD AUTO: 7.8 K/UL — SIGNIFICANT CHANGE UP (ref 3.8–10.5)

## 2017-12-12 RX ORDER — QUETIAPINE FUMARATE 200 MG/1
1 TABLET, FILM COATED ORAL
Qty: 0 | Refills: 0 | COMMUNITY

## 2017-12-12 RX ORDER — SIMETHICONE 80 MG/1
1 TABLET, CHEWABLE ORAL
Qty: 0 | Refills: 0 | COMMUNITY
Start: 2017-12-12

## 2017-12-12 RX ORDER — LANOLIN ALCOHOL/MO/W.PET/CERES
1 CREAM (GRAM) TOPICAL
Qty: 0 | Refills: 0 | COMMUNITY
Start: 2017-12-12

## 2017-12-12 RX ORDER — RISPERIDONE 4 MG/1
1 TABLET ORAL
Qty: 0 | Refills: 0 | COMMUNITY
Start: 2017-12-12

## 2017-12-12 RX ORDER — WARFARIN SODIUM 2.5 MG/1
1 TABLET ORAL
Qty: 0 | Refills: 0 | COMMUNITY

## 2017-12-12 RX ORDER — QUETIAPINE FUMARATE 200 MG/1
1 TABLET, FILM COATED ORAL
Qty: 0 | Refills: 0 | COMMUNITY
Start: 2017-12-12

## 2017-12-12 RX ORDER — ASPIRIN/CALCIUM CARB/MAGNESIUM 324 MG
1 TABLET ORAL
Qty: 0 | Refills: 0 | COMMUNITY
Start: 2017-12-12

## 2017-12-12 RX ADMIN — Medication 100 MILLIGRAM(S): at 11:49

## 2017-12-12 RX ADMIN — BRIMONIDINE TARTRATE 1 DROP(S): 2 SOLUTION/ DROPS OPHTHALMIC at 05:55

## 2017-12-12 RX ADMIN — RISPERIDONE 1 MILLIGRAM(S): 4 TABLET ORAL at 11:50

## 2017-12-12 RX ADMIN — Medication 81 MILLIGRAM(S): at 11:49

## 2017-12-12 RX ADMIN — QUETIAPINE FUMARATE 25 MILLIGRAM(S): 200 TABLET, FILM COATED ORAL at 17:20

## 2017-12-12 RX ADMIN — BRIMONIDINE TARTRATE 1 DROP(S): 2 SOLUTION/ DROPS OPHTHALMIC at 17:20

## 2017-12-12 RX ADMIN — GABAPENTIN 300 MILLIGRAM(S): 400 CAPSULE ORAL at 11:49

## 2017-12-12 RX ADMIN — CARVEDILOL PHOSPHATE 3.12 MILLIGRAM(S): 80 CAPSULE, EXTENDED RELEASE ORAL at 05:55

## 2017-12-12 RX ADMIN — QUETIAPINE FUMARATE 25 MILLIGRAM(S): 200 TABLET, FILM COATED ORAL at 05:55

## 2017-12-12 NOTE — PROGRESS NOTE ADULT - PROVIDER SPECIALTY LIST ADULT
Cardiology
Critical Care
Critical Care
Internal Medicine
Nephrology
Nephrology
Palliative Care
Surgery
Internal Medicine

## 2017-12-12 NOTE — PROGRESS NOTE ADULT - SUBJECTIVE AND OBJECTIVE BOX
Patient is a 89y old  Male who presents with a chief complaint of Coffee ground emesis, abdominal distention, and upper abdominal pain x 3 days (05 Dec 2017 22:19)  INR 2.89.  No distress.     INTERVAL HPI/OVERNIGHT EVENTS: uneventful. had a fall today. no skeletal trauma.    MEDICATIONS  (STANDING):  aspirin  chewable 81 milliGRAM(s) Oral daily  brimonidine 0.2% Ophthalmic Solution 1 Drop(s) Both EYES two times a day  carvedilol 3.125 milliGRAM(s) Oral every 12 hours  docusate sodium 100 milliGRAM(s) Oral daily  gabapentin 300 milliGRAM(s) Oral daily  QUEtiapine 25 milliGRAM(s) Oral three times a day  risperiDONE   Tablet 1 milliGRAM(s) Oral daily  senna 2 Tablet(s) Oral at bedtime    MEDICATIONS  (PRN):  acetaminophen   Tablet. 650 milliGRAM(s) Oral every 6 hours PRN Mild Pain (1 - 3)  benzocaine 15 mG/menthol 3.6 mG Lozenge 1 Lozenge Oral every 4 hours PRN Sore Throat  bisacodyl Suppository 10 milliGRAM(s) Rectal daily PRN Constipation  melatonin 3 milliGRAM(s) Oral at bedtime PRN Insomnia  oxyCODONE    5 mG/acetaminophen 325 mG 1 Tablet(s) Oral every 6 hours PRN Moderate Pain (4 - 6)  simethicone 80 milliGRAM(s) Chew every 8 hours PRN Gas      Allergies    No Known Allergies    Intolerances        REVIEW OF SYSTEMS:  CONSTITUTIONAL: No fever, weight loss, or fatigue  EYES: No eye pain, visual disturbances, or discharge  ENMT:  No difficulty hearing, tinnitus, vertigo; No sinus or throat pain  NECK: No pain or stiffness  BREASTS: No pain, masses, or nipple discharge  RESPIRATORY: No cough, wheezing, chills or hemoptysis; No shortness of breath  CARDIOVASCULAR: No chest pain, palpitations, dizziness, or leg swelling  GASTROINTESTINAL: No abdominal or epigastric pain. No nausea, vomiting, or hematemesis; No diarrhea or constipation. No melena or hematochezia.  GENITOURINARY: No dysuria, frequency, hematuria, or incontinence  NEUROLOGICAL: No headaches, memory loss, loss of strength, numbness, or tremors  SKIN: No itching, burning, rashes, or lesions   LYMPH NODES: No enlarged glands  ENDOCRINE: No heat or cold intolerance; No hair loss  MUSCULOSKELETAL: No joint pain or swelling; No muscle, back, or extremity pain  PSYCHIATRIC: No depression, anxiety, mood swings, or difficulty sleeping  HEME/LYMPH: No easy bruising, or bleeding gums  ALLERY AND IMMUNOLOGIC: No hives or eczema    Vital Signs Last 24 Hrs  T(C): 36.4 (12 Dec 2017 05:00), Max: 36.8 (11 Dec 2017 17:39)  T(F): 97.6 (12 Dec 2017 05:00), Max: 98.3 (11 Dec 2017 17:39)  HR: 60 (12 Dec 2017 05:00) (58 - 61)  BP: 153/63 (12 Dec 2017 05:00) (111/51 - 162/61)  BP(mean): --  RR: 16 (12 Dec 2017 05:00) (16 - 19)  SpO2: 93% (12 Dec 2017 05:00) (93% - 99%)    PHYSICAL EXAM:  GENERAL: NAD, well-groomed, well-developed  HEAD:  Atraumatic, Normocephalic  EYES: EOMI, PERRLA, conjunctiva and sclera clear  ENMT: No tonsillar erythema, exudates, or enlargement; Moist mucous membranes, Good dentition, No lesions  NECK: Supple, No JVD, Normal thyroid  NERVOUS SYSTEM:  Alert & Oriented X3, Good concentration; Motor Strength 5/5 B/L upper and lower extremities; DTRs 2+ intact and symmetric  CHEST/LUNG: Clear to percussion bilaterally; No rales, rhonchi, wheezing, or rubs  HEART: Regular rate and rhythm; No murmurs, rubs, or gallops  ABDOMEN: Soft, Nontender, Nondistended; Bowel sounds present  EXTREMITIES:  2+ Peripheral Pulses, No clubbing, cyanosis, or edema  LYMPH: No lymphadenopathy noted  SKIN: No rashes or lesions    LABS:                        9.4    7.8   )-----------( 390      ( 12 Dec 2017 07:21 )             30.3     12-12    142  |  106  |  24<H>  ----------------------------<  128<H>  4.2   |  29  |  1.38<H>    Ca    8.7      12 Dec 2017 07:21  Phos  2.7     12-11  Mg     2.4     12-11        PT/INR - ( 12 Dec 2017 07:21 )   PT: 32.2 sec;   INR: 2.89 ratio             CAPILLARY BLOOD GLUCOSE                    RADIOLOGY & ADDITIONAL TESTS:    Imaging Personally Reviewed:  [ ] YES  [ ] NO    Consultant(s) Notes Reviewed:  [ ] YES  [ ] NO    Care Discussed with Consultants/Other Providers [ ] YES  [ ] NO    Care discussed with family,         [  ]   yes  [  ]  No    imp:    stable[x ]    unstable[  ]     improving [   ]       unchanged  [  ]                Plans:  Continue present plans  [x  ] repeat INR in 2 days and restart  coumadin  at 1 mg daily.               New consult [  ]   specialty  .......               order test[  ]    test name.                  Discharge Planning  [  ]

## 2017-12-12 NOTE — PROGRESS NOTE ADULT - SUBJECTIVE AND OBJECTIVE BOX
HEMODYNAMICALLY STABLE  INR THERAPEUTIC  STABLE CARDIOVASCULAR STATUS; CONTINUING WITH PRESENT MANAGEMENT.

## 2017-12-12 NOTE — PROGRESS NOTE ADULT - SUBJECTIVE AND OBJECTIVE BOX
Patient seen and examined at bedside with no complaints. Admits to flatus/BM. Denies pain, nasuea/vomiting. Tolerating diet.    Vital Signs Last 24 Hrs  T(F): 97.8 (12-12-17 @ 10:57), Max: 98.3 (12-11-17 @ 17:39)  HR: 60 (12-12-17 @ 10:57)  BP: 147/72 (12-12-17 @ 10:57)  RR: 17 (12-12-17 @ 10:57)  SpO2: 94% (12-12-17 @ 10:57)    GENERAL: Alert, NAD  CHEST/LUNG: Clear to auscultation bilaterally, respirations nonlabored  HEART: S1S2, Regular rate and rhythm;   ABDOMEN: Midline abdominal wound well healed with mild erythema surrounding the staple line. Midline staples removed, patient tolerated well. + Bowel sounds, soft, Nontender, mild distention  EXTREMITIES:  no calf tenderness, mild pitting edema bilaterally    LABS:                        9.4    7.8   )-----------( 390      ( 12 Dec 2017 07:21 )             30.3     12-12    142  |  106  |  24<H>  ----------------------------<  128<H>  4.2   |  29  |  1.38<H>    Ca    8.7      12 Dec 2017 07:21  Phos  2.7     12-11  Mg     2.4     12-11    PT/INR - ( 12 Dec 2017 07:21 )   PT: 32.2 sec;   INR: 2.89 ratio      89y old Male s/p exploratory laparotomy, Lysis of adhesions, reduction of right inguinal hernia secondary to ischemic bowel secondary to incarcerated right inguinal hernia, POD# 16 with PMH Hip fracture, Afib, Peripheral neuropathy, CVA, GERD, BPH, Prostate cancer, CHF, HTN    - INR now within range, per medicine patient may be discharge off of Coumadin for two days, Repeat INR in two days and if low please resume Coumadin at 1mg.   - continue dysphagia diet  - DVT prophylaxis, Incentive Spirometer, OOB, Ambulating, pain control  - continue current management per medicine  - will discuss with Dr. Starks  - Discharge planning to subacute rehab

## 2017-12-12 NOTE — CHART NOTE - NSCHARTNOTEFT_GEN_A_CORE
Assessment:   89 y.o male c ischemic bowel 2/2 inguinal hernia reduction; confused mental state    Factors impacting intake: [ ] none [ ] nausea  [ ] vomiting [ ] diarrhea [ ] constipation  [ ]chewing problems [ ] swallowing issues  [X ] other: cognitive impairment    Diet Presciption: Diet, Dysphagia 3 Soft-Thin Liquids:   Supplement Feeding Modality:  Oral  Ensure Enlive Servings Per Day:  1       Frequency:  Three Times a day (12-05-17 @ 16:16)    Intake:  staff reports pt consume approx. 50% most meals    Current Weight: 105.2 kg (12/12); previous wt 103.6 kg (12/5)  % Weight Change: 2% gain x 1 week; most likely due to fluid retention    Pertinent Medications: MEDICATIONS  (STANDING):  aspirin  chewable 81 milliGRAM(s) Oral daily  brimonidine 0.2% Ophthalmic Solution 1 Drop(s) Both EYES two times a day  carvedilol 3.125 milliGRAM(s) Oral every 12 hours  docusate sodium 100 milliGRAM(s) Oral daily  gabapentin 300 milliGRAM(s) Oral daily  QUEtiapine 25 milliGRAM(s) Oral three times a day  risperiDONE   Tablet 1 milliGRAM(s) Oral daily  senna 2 Tablet(s) Oral at bedtime    MEDICATIONS  (PRN):  acetaminophen   Tablet. 650 milliGRAM(s) Oral every 6 hours PRN Mild Pain (1 - 3)  benzocaine 15 mG/menthol 3.6 mG Lozenge 1 Lozenge Oral every 4 hours PRN Sore Throat  bisacodyl Suppository 10 milliGRAM(s) Rectal daily PRN Constipation  melatonin 3 milliGRAM(s) Oral at bedtime PRN Insomnia  oxyCODONE    5 mG/acetaminophen 325 mG 1 Tablet(s) Oral every 6 hours PRN Moderate Pain (4 - 6)  simethicone 80 milliGRAM(s) Chew every 8 hours PRN Gas    Pertinent Labs: 12-12 Na142 mmol/L Glu 128 mg/dL<H> K+ 4.2 mmol/L Cr  1.38 mg/dL<H> BUN 24 mg/dL<H> Phos n/a   Alb n/a   PAB n/a        CAPILLARY BLOOD GLUCOSE        Skin: WDL; 2+ generalized edema noted  BM regular; last x 1 (12/12)    Estimated Needs:   [ X] no change since previous assessment  [ ] recalculated:     Previous Nutrition Diagnosis:   [X ] Inadequate Energy Intake [ ]Inadequate Oral Intake [ ] Excessive Energy Intake   [ ] Underweight [ ] Increased Nutrient Needs [ ] Overweight/Obesity   [ ] Altered GI Function [ ] Unintended Weight Loss [ ] Food & Nutrition Related Knowledge Deficit [ ] Malnutrition     Nutrition Diagnosis is [X ] ongoing  [ ] resolved [ ] not applicable     New Nutrition Diagnosis: [X ] not applicable       Interventions: Continue diet rx as noted  Recommend  [ ] Change Diet To:  [ ] Nutrition Supplement  [ ] Nutrition Support  [ ] Other:     Monitoring and Evaluation:   [X ] PO intake [ x ] Tolerance to diet prescription [ x ] weights [ x ] labs[ x ] follow up per protocol  [ ] other:

## 2017-12-12 NOTE — PROGRESS NOTE ADULT - ASSESSMENT
hemodynamically stable. Phosphate supplementation. hold coumadin today
Pt is a 89 yo WM with h/o CHF, A fib on Coumadin, s/p PPM placement, HTN, CVA, GERD, BPH, s/p hip replacement, seed implantation for prostate Ca, kidney cyst removal and mild dementia.  Pt presented with a few days of abdominal pain, and coffee ground emesis.  Pt found to have lactic acidosis, an elevated INR, acute vs acute on CKD  and CT abd/pelvis: Portal venous gas within the liver and extensive small bowel pneumatosis concerning for ischemic bowel. Massive dilatation of stomach and numerous small bowel loops most likely related to a right inguinal hernia containing small bowel and cecum. Mild interloop fluid and mesenteric edema. The colon is collapsed. Pt was taken to OR; exp lap, BEATRIS and reduction of incarcerated hernia. Pt was left intubated and transferred to the ICU.     Resp: Sedation held, pt beginning to wake up and weaning well on CPAP 5 + PS 10->5; extubated  ID: s/p Zosyn and Vanco  CVS: Pt with (+) trop; if BP stays stable start BB and when ok with Surg start Asa  HEME: When ok with Surg start AC  FEN: NPO/Cont IVF  Endo: Follow Glu  GI: F/u as per Surg  Renal: Follow BUN/Cr and UO  Neuro: Sedation held for extubation/ May require prn analgesia  Social: Son at bedside and updated    CCT: 45 min
Pt is a 89 yo WM with h/o CHF, A fib on Coumadin, s/p PPM placement, HTN, CVA, GERD, BPH, s/p hip replacement, seed implantation for prostate Ca, kidney cyst removal and mild dementia.  Pt presented with a few days of abdominal pain, and coffee ground emesis.  Pt found to have lactic acidosis, an elevated INR, acute vs acute on CKD  and CT abd/pelvis: Portal venous gas within the liver and extensive small bowel pneumatosis concerning for ischemic bowel. Massive dilatation of stomach and numerous small bowel loops most likely related to a right inguinal hernia containing small bowel and cecum. Mild interloop fluid and mesenteric edema. The colon is collapsed. Pt was taken to OR; exp lap, BEATRIS and reduction of incarcerated hernia. Pt was left intubated and transferred to the ICU. s/p extubation 11/27    Resp: Supplemental O2 prn/ IS  CVS: Pt with (+) trop; Started on Coreg and would add Asa  HEME: Coumadin restarted and adjust to INR  FEN: Advance diet as per Surg  Endo: Follow Glu  GI: F/u as per Surg  Renal: Follow BUN/Cr and UO  Neuro: Prn analgesia  Social: May transfer to [facility]
hemodynamically stable.
hemodynamically stable.
hemodynamically stable. .Correct hypophosphatemia and  hold coumadin for 2  more days. monitor INR  and titrate Coumadin dosage to INR ( therapeutic 2-3)
hemodynamically stable. chronic a. fib, PPm, coumadin indusced coagulopathy. S/ P Laparotomy and BEATRIS. , Dementia.
hemodynamiclly stable. dementia, A. fib , PPM.HTn
improving after abd surgery, a. fib. PPm
medically stable. Plan as per surgery
s/p Jigar. , . A. Fib . PPm. Coagulapathy
s/p Laparotomy, ,PPM, a. fib, acute diastolic heart failure, 9 Resolved). Dementia, DNR status.HTn
s/p laparotomy, Dementia, PPm, A. fib. Zena
stable for discharge . Hemodynamically stable . INR can be followed up as Op
unchanged clinically

## 2017-12-15 RX ORDER — WARFARIN SODIUM 2.5 MG/1
1 TABLET ORAL
Qty: 0 | Refills: 0 | COMMUNITY
Start: 2017-12-15

## 2017-12-18 DIAGNOSIS — K92.2 GASTROINTESTINAL HEMORRHAGE, UNSPECIFIED: ICD-10-CM

## 2017-12-18 DIAGNOSIS — E83.39 OTHER DISORDERS OF PHOSPHORUS METABOLISM: ICD-10-CM

## 2017-12-18 DIAGNOSIS — I13.0 HYPERTENSIVE HEART AND CHRONIC KIDNEY DISEASE WITH HEART FAILURE AND STAGE 1 THROUGH STAGE 4 CHRONIC KIDNEY DISEASE, OR UNSPECIFIED CHRONIC KIDNEY DISEASE: ICD-10-CM

## 2017-12-18 DIAGNOSIS — K55.9 VASCULAR DISORDER OF INTESTINE, UNSPECIFIED: ICD-10-CM

## 2017-12-18 DIAGNOSIS — N40.0 BENIGN PROSTATIC HYPERPLASIA WITHOUT LOWER URINARY TRACT SYMPTOMS: ICD-10-CM

## 2017-12-18 DIAGNOSIS — K21.9 GASTRO-ESOPHAGEAL REFLUX DISEASE WITHOUT ESOPHAGITIS: ICD-10-CM

## 2017-12-18 DIAGNOSIS — N17.0 ACUTE KIDNEY FAILURE WITH TUBULAR NECROSIS: ICD-10-CM

## 2017-12-18 DIAGNOSIS — Z79.01 LONG TERM (CURRENT) USE OF ANTICOAGULANTS: ICD-10-CM

## 2017-12-18 DIAGNOSIS — I44.7 LEFT BUNDLE-BRANCH BLOCK, UNSPECIFIED: ICD-10-CM

## 2017-12-18 DIAGNOSIS — I24.8 OTHER FORMS OF ACUTE ISCHEMIC HEART DISEASE: ICD-10-CM

## 2017-12-18 DIAGNOSIS — I50.21 ACUTE SYSTOLIC (CONGESTIVE) HEART FAILURE: ICD-10-CM

## 2017-12-18 DIAGNOSIS — E87.2 ACIDOSIS: ICD-10-CM

## 2017-12-18 DIAGNOSIS — I48.0 PAROXYSMAL ATRIAL FIBRILLATION: ICD-10-CM

## 2017-12-18 DIAGNOSIS — Z95.0 PRESENCE OF CARDIAC PACEMAKER: ICD-10-CM

## 2017-12-18 DIAGNOSIS — K40.30 UNILATERAL INGUINAL HERNIA, WITH OBSTRUCTION, WITHOUT GANGRENE, NOT SPECIFIED AS RECURRENT: ICD-10-CM

## 2017-12-18 DIAGNOSIS — Z86.73 PERSONAL HISTORY OF TRANSIENT ISCHEMIC ATTACK (TIA), AND CEREBRAL INFARCTION WITHOUT RESIDUAL DEFICITS: ICD-10-CM

## 2017-12-18 DIAGNOSIS — G62.9 POLYNEUROPATHY, UNSPECIFIED: ICD-10-CM

## 2017-12-18 DIAGNOSIS — F03.90 UNSPECIFIED DEMENTIA WITHOUT BEHAVIORAL DISTURBANCE: ICD-10-CM

## 2017-12-18 DIAGNOSIS — E86.0 DEHYDRATION: ICD-10-CM

## 2017-12-18 DIAGNOSIS — N18.9 CHRONIC KIDNEY DISEASE, UNSPECIFIED: ICD-10-CM

## 2017-12-18 DIAGNOSIS — Z66 DO NOT RESUSCITATE: ICD-10-CM

## 2017-12-18 DIAGNOSIS — Z96.649 PRESENCE OF UNSPECIFIED ARTIFICIAL HIP JOINT: ICD-10-CM

## 2017-12-18 DIAGNOSIS — Z85.46 PERSONAL HISTORY OF MALIGNANT NEOPLASM OF PROSTATE: ICD-10-CM

## 2017-12-27 ENCOUNTER — OUTPATIENT (OUTPATIENT)
Dept: OUTPATIENT SERVICES | Facility: HOSPITAL | Age: 82
LOS: 1 days | Discharge: HOME | End: 2017-12-27
Payer: MEDICARE

## 2017-12-27 DIAGNOSIS — R22.30 LOCALIZED SWELLING, MASS AND LUMP, UNSPECIFIED UPPER LIMB: ICD-10-CM

## 2017-12-27 DIAGNOSIS — Z85.46 PERSONAL HISTORY OF MALIGNANT NEOPLASM OF PROSTATE: Chronic | ICD-10-CM

## 2017-12-27 PROCEDURE — 73130 X-RAY EXAM OF HAND: CPT | Mod: 26,LT

## 2017-12-28 PROCEDURE — 99203 OFFICE O/P NEW LOW 30 MIN: CPT

## 2018-06-01 NOTE — PROGRESS NOTE ADULT - SUBJECTIVE AND OBJECTIVE BOX
The skin of the bilateral groins was clipped, prepped and draped in the usual sterile manner. (If not otherwise specified, skin prep was bilateral.)  Patient is a 89y old  Male who presents with a chief complaint of Coffee ground emesis, abdominal distention, and upper abdominal pain x 3 days (05 Dec 2017 22:19)    s/p Laparotomy and BEATRIS..  chronic A. fib. PPm. dementia.   INTERVAL HPI/OVERNIGHT EVENTS: uneventful    MEDICATIONS  (STANDING):  aspirin  chewable 81 milliGRAM(s) Oral daily  brimonidine 0.2% Ophthalmic Solution 1 Drop(s) Both EYES two times a day  carvedilol 3.125 milliGRAM(s) Oral every 12 hours  dextrose 5% + sodium chloride 0.45%. 1000 milliLiter(s) (50 mL/Hr) IV Continuous <Continuous>  gabapentin 300 milliGRAM(s) Oral daily  QUEtiapine 25 milliGRAM(s) Oral three times a day    MEDICATIONS  (PRN):  acetaminophen   Tablet. 650 milliGRAM(s) Oral every 6 hours PRN Mild Pain (1 - 3)  benzocaine 15 mG/menthol 3.6 mG Lozenge 1 Lozenge Oral every 4 hours PRN Sore Throat  bisacodyl Suppository 10 milliGRAM(s) Rectal daily PRN Constipation  fentaNYL    Injectable 50 MICROGram(s) IV Push every 6 hours PRN Moderate Pain (4 - 6)  melatonin 3 milliGRAM(s) Oral at bedtime PRN Insomnia      Allergies    No Known Allergies    Intolerances        REVIEW OF SYSTEMS:  CONSTITUTIONAL: No fever, weight loss, or fatigue  EYES: No eye pain, visual disturbances, or discharge  ENMT:  No difficulty hearing, tinnitus, vertigo; No sinus or throat pain  NECK: No pain or stiffness  BREASTS: No pain, masses, or nipple discharge  RESPIRATORY: No cough, wheezing, chills or hemoptysis; No shortness of breath  CARDIOVASCULAR: No chest pain, palpitations, dizziness, or leg swelling  GASTROINTESTINAL: No abdominal or epigastric pain. No nausea, vomiting, or hematemesis; No diarrhea or constipation. No melena or hematochezia.  GENITOURINARY: No dysuria, frequency, hematuria, or incontinence  NEUROLOGICAL: No headaches, memory loss, loss of strength, numbness, or tremors  SKIN: No itching, burning, rashes, or lesions   LYMPH NODES: No enlarged glands  ENDOCRINE: No heat or cold intolerance; No hair loss  MUSCULOSKELETAL: No joint pain or swelling; No muscle, back, or extremity pain  PSYCHIATRIC: No depression, anxiety, mood swings, or difficulty sleeping  HEME/LYMPH: No easy bruising, or bleeding gums  ALLERY AND IMMUNOLOGIC: No hives or eczema    Vital Signs Last 24 Hrs  T(C): 36.4 (06 Dec 2017 05:05), Max: 37.1 (05 Dec 2017 17:00)  T(F): 97.6 (06 Dec 2017 05:05), Max: 98.8 (05 Dec 2017 17:00)  HR: 60 (06 Dec 2017 05:05) (60 - 89)  BP: 147/57 (06 Dec 2017 05:05) (116/51 - 147/57)  BP(mean): --  RR: 16 (06 Dec 2017 05:05) (16 - 20)  SpO2: 95% (06 Dec 2017 05:05) (94% - 100%)    PHYSICAL EXAM:  GENERAL: NAD, well-groomed, well-developed  HEAD:  Atraumatic, Normocephalic  EYES: EOMI, PERRLA, conjunctiva and sclera clear  ENMT: No tonsillar erythema, exudates, or enlargement; Moist mucous membranes, Good dentition, No lesions  NECK: Supple, No JVD, Normal thyroid  NERVOUS SYSTEM:  Alert & Oriented X3, Good concentration; Motor Strength 5/5 B/L upper and lower extremities; DTRs 2+ intact and symmetric  CHEST/LUNG: Clear to percussion bilaterally; No rales, rhonchi, wheezing, or rubs  HEART: Regular rate and rhythm; No murmurs, rubs, or gallops  ABDOMEN: Soft, Nontender, Nondistended; Bowel sounds present  EXTREMITIES:  2+ Peripheral Pulses, No clubbing, cyanosis, or edema  LYMPH: No lymphadenopathy noted  SKIN: No rashes or lesions    LABS:                        9.4    10.1  )-----------( 242      ( 06 Dec 2017 07:37 )             29.8     12-06    140  |  106  |  31<H>  ----------------------------<  129<H>  3.9   |  25  |  1.34<H>    Ca    7.8<L>      06 Dec 2017 07:37  Phos  1.8     12-06  Mg     2.1     12-06    TPro  5.7<L>  /  Alb  2.3<L>  /  TBili  0.7  /  DBili  x   /  AST  20  /  ALT  25  /  AlkPhos  77  12-      PT/INR - ( 06 Dec 2017 07:37 )   PT: 47.9 sec;   INR: 4.27 ratio           Urinalysis Basic - ( 05 Dec 2017 15:41 )    Color: Yellow / Appearance: Clear / S.015 / pH: x  Gluc: x / Ketone: Negative  / Bili: Negative / Urobili: Negative mg/dL   Blood: x / Protein: Negative mg/dL / Nitrite: Negative   Leuk Esterase: Negative / RBC: 6-10 /HPF / WBC 0-2   Sq Epi: x / Non Sq Epi: Moderate / Bacteria: Moderate      < from: Xray Chest 1 View AP/PA. (17 @ 12:34) >  EXAM:  CHEST SINGLE VIEW                            PROCEDURE DATE:  2017          INTERPRETATION:  cough      A single portable radiograph suggests cardiomegaly, although there may be   magnification from technique..    Vascular markings areincreased    Septal thickening is noted without consolidation    Pacemaker in situ.    A small left pleural effusion is noted.    The osseous structures are grossly intact.    IMPRESSION:    Cardiomegaly with mild congestion. Pacemaker in situ..        < end of copied text >                      RADIOLOGY & ADDITIONAL TESTS:    Imaging Personally Reviewed:  [ ] YES  [ ] NO    Consultant(s) Notes Reviewed:  [ ] YES  [ ] NO    Care Discussed with Consultants/Other Providers [ ] YES  [ ] NO    Care discussed with family,         [  ]   yes  [  ]  No    imp:    stable[ ]    unstable[  ]     improving [  x ]       unchanged  [  ]                Plans:  Continue present plans  [ x ] correct  Hypophosphatemia, hold coumadin for 2 more days, monitor INR               New consult [  ]   specialty  .......               order test[  ]    test name.                  Discharge Planning  [  ]

## 2019-08-05 NOTE — SWALLOW BEDSIDE ASSESSMENT ADULT - SLP REFERRING PHYSICIAN
08/04/19  1919   TROPONINI <0.01       Coagulation:   Lab Results   Component Value Date    INR 2.22 08/19/2017    APTT 32.3 04/17/2012     Cardiac markers:   Lab Results   Component Value Date    CKTOTAL 187 04/10/2012    TROPONINI <0.01 08/04/2019         Lab Results   Component Value Date    ALT 8 (L) 08/05/2019    AST 11 (L) 08/05/2019    ALKPHOS 112 08/05/2019    BILITOT <0.2 08/05/2019       Lab Results   Component Value Date    INR 2.22 (H) 08/19/2017    INR 1.08 04/17/2012    PROTIME 25.4 (H) 08/19/2017    PROTIME 12.1 04/17/2012       Radiology    Chest xray   Mild left basilar atelectasis.  Pneumonia not excluded. Cultures:   Sputum pending      Assessment & Plan:    1. Hypertensive urgency, 218/113 at arrival to [facility], 177/105 at time of transfer w/o intervention, possibly exacerbated by respiratory distress. resumed home coreg, losartan increased, prn latetolol- can add norvasc      2. aeCOPD and hypoxic resp failure -  IV solumedrol, IV Azithro, PRN/CHANDU intensive NEB therapy. Check respiratory culture and pro calcitonin neg  Pulm consulted  Hold home regimen for now. Has upcoming new pt appt w/ Dr. Tona Draper in September. 3. HLD, cont statin    4. Hx a fib, rate controlled, cont home regimen including Xarelto.     5. GERD, cont PPI.     DVT Prophylaxis: Xarelto  Diet: general  Code Status: DNR-CCA, c/w pt wishes     Danette Avila MD 8/5/2019 8:25 AM [place]

## 2024-01-29 NOTE — ED ADULT NURSE NOTE - NS PRO PASSIVE SMOKE EXP
----- Message from Blake Abdul MD sent at 1/15/2024  8:23 AM EST -----  Renal liver good  Mild anemia noted, start ferrous sulfate 325 mg daily x 60 days  Sugars higher than before, watch diet, avoid high carb diet     No

## 2024-04-04 NOTE — AIRWAY REMOVAL NOTE  ADULT & PEDS - REMOVAL OF AN ARTIFICAL AIRWAY DATE AND TIME
You are due for repeat colonoscopy in 9/2024-call now to arrange   Get fasting labs done in next few days (no food after midnight but can have water/black coffee/pills in AM)  We are increasing Ozempic dose to 1 mg weekly to see if this helps the sugars/weight  Continue to work hard on healthy diet and exercise regularly for weight loss  Continue all other meds as directed  Follow up in 6 months-full physical     
27-Nov-2017 14:04

## 2024-04-15 NOTE — PATIENT PROFILE ADULT. - NS PRO TALK SOMEONE YN
Not due until 11/16/2024  Will postpone until September 2024    Referral Type: Internal  Location: [facility]  Procedure: Colonoscopy  Referral Diagnosis: Screening  Referring Provider: RACH Sue  Referral To: Dr. Michael Pandya  Referral Notes: hydroCHLOROthiazide (HYDRODIURIL) 25 MG     Previous Procedure: Colonoscopy YES   Date: 11/16/2017  Provider: MICHAEL PANDYA MD  Sedation Used: MAC  Findings: Benign colon polyps including one Tubular adenoma   Notes: Colonoscopy for surveillance in 7 years     2nd Previous Procedure: EGD NO    unable to assess